# Patient Record
Sex: FEMALE | Race: BLACK OR AFRICAN AMERICAN | Employment: FULL TIME | URBAN - NONMETROPOLITAN AREA
[De-identification: names, ages, dates, MRNs, and addresses within clinical notes are randomized per-mention and may not be internally consistent; named-entity substitution may affect disease eponyms.]

---

## 2017-01-16 DIAGNOSIS — E55.9 HYPOVITAMINOSIS D: Primary | ICD-10-CM

## 2017-01-16 RX ORDER — ERGOCALCIFEROL 1.25 MG/1
50000 CAPSULE ORAL WEEKLY
Qty: 8 CAPSULE | Refills: 0 | Status: SHIPPED | OUTPATIENT
Start: 2017-01-16 | End: 2017-06-12 | Stop reason: ALTCHOICE

## 2017-02-08 ENCOUNTER — TELEPHONE (OUTPATIENT)
Dept: OTHER | Age: 47
End: 2017-02-08

## 2017-02-14 ENCOUNTER — OFFICE VISIT (OUTPATIENT)
Dept: OTHER | Age: 47
End: 2017-02-14

## 2017-02-14 VITALS — HEIGHT: 67 IN | BODY MASS INDEX: 42.53 KG/M2 | WEIGHT: 271 LBS

## 2017-02-14 DIAGNOSIS — Z79.4 TYPE 2 DIABETES MELLITUS WITH HYPERGLYCEMIA, WITH LONG-TERM CURRENT USE OF INSULIN (HCC): Primary | ICD-10-CM

## 2017-02-14 DIAGNOSIS — E11.65 TYPE 2 DIABETES MELLITUS WITH HYPERGLYCEMIA, WITH LONG-TERM CURRENT USE OF INSULIN (HCC): Primary | ICD-10-CM

## 2017-02-14 PROCEDURE — G0108 DIAB MANAGE TRN  PER INDIV: HCPCS | Performed by: REGISTERED NURSE

## 2017-02-14 RX ORDER — SODIUM HYPOCHLORITE 1.25 MG/ML
1 SOLUTION TOPICAL 3 TIMES DAILY
Refills: 0 | COMMUNITY
Start: 2017-02-02 | End: 2017-04-06 | Stop reason: ALTCHOICE

## 2017-02-14 RX ORDER — MINOCYCLINE HYDROCHLORIDE 50 MG/1
50 CAPSULE ORAL 2 TIMES DAILY
Refills: 0 | COMMUNITY
Start: 2017-02-01 | End: 2017-04-06 | Stop reason: ALTCHOICE

## 2017-02-16 ENCOUNTER — TELEPHONE (OUTPATIENT)
Dept: OTHER | Age: 47
End: 2017-02-16

## 2017-02-16 LAB
ESTIMATED AVERAGE GLUCOSE: 189 MG/DL
HBA1C MFR BLD: 8.2 % (ref 4.4–6.4)

## 2017-02-20 ENCOUNTER — TELEPHONE (OUTPATIENT)
Dept: INTERNAL MEDICINE | Age: 47
End: 2017-02-20

## 2017-02-21 ENCOUNTER — OFFICE VISIT (OUTPATIENT)
Dept: ENDOCRINOLOGY | Age: 47
End: 2017-02-21

## 2017-02-21 VITALS
DIASTOLIC BLOOD PRESSURE: 68 MMHG | WEIGHT: 272.5 LBS | HEART RATE: 79 BPM | RESPIRATION RATE: 20 BRPM | SYSTOLIC BLOOD PRESSURE: 133 MMHG | BODY MASS INDEX: 42.77 KG/M2 | HEIGHT: 67 IN

## 2017-02-21 DIAGNOSIS — Z79.4 TYPE 2 DIABETES MELLITUS WITH HYPERGLYCEMIA, WITH LONG-TERM CURRENT USE OF INSULIN (HCC): Primary | ICD-10-CM

## 2017-02-21 DIAGNOSIS — E55.9 HYPOVITAMINOSIS D: ICD-10-CM

## 2017-02-21 DIAGNOSIS — E11.628 DIABETIC FOOT INFECTION (HCC): ICD-10-CM

## 2017-02-21 DIAGNOSIS — E78.00 PURE HYPERCHOLESTEROLEMIA: ICD-10-CM

## 2017-02-21 DIAGNOSIS — L08.9 DIABETIC FOOT INFECTION (HCC): ICD-10-CM

## 2017-02-21 DIAGNOSIS — E11.65 TYPE 2 DIABETES MELLITUS WITH HYPERGLYCEMIA, WITH LONG-TERM CURRENT USE OF INSULIN (HCC): Primary | ICD-10-CM

## 2017-02-21 DIAGNOSIS — I10 ESSENTIAL HYPERTENSION: ICD-10-CM

## 2017-02-21 PROCEDURE — 99214 OFFICE O/P EST MOD 30 MIN: CPT | Performed by: INTERNAL MEDICINE

## 2017-02-21 ASSESSMENT — ENCOUNTER SYMPTOMS: BLURRED VISION: 0

## 2017-03-01 ENCOUNTER — TELEPHONE (OUTPATIENT)
Dept: OTHER | Age: 47
End: 2017-03-01

## 2017-03-03 LAB
CHOLESTEROL/HDL RELATIVE RISK: 3.5 (ref 4–4.4)
CHOLESTEROL: 136 MG/DL
CREATININE, RANDOM URINE: 172.9 MG/DL
DIRECT-LDL / HDL RISK: 2.3
HDLC SERPL-MCNC: 38 MG/DL
LDL CHOLESTEROL DIRECT: 90 MG/DL
MICROALBUMIN UR-MCNC: 5.7 MG/L
MICROALBUMIN/CREAT UR-RTO: 3.3 MG/GM (ref 0–30)
TRIGL SERPL-MCNC: 157 MG/DL
VLDLC SERPL CALC-MCNC: 31 MG/DL

## 2017-03-09 ENCOUNTER — TELEPHONE (OUTPATIENT)
Dept: OTHER | Age: 47
End: 2017-03-09

## 2017-03-29 ENCOUNTER — TELEPHONE (OUTPATIENT)
Dept: OTHER | Age: 47
End: 2017-03-29

## 2017-03-30 ENCOUNTER — TELEPHONE (OUTPATIENT)
Dept: OTHER | Age: 47
End: 2017-03-30

## 2017-04-06 ENCOUNTER — OFFICE VISIT (OUTPATIENT)
Dept: OTHER | Age: 47
End: 2017-04-06

## 2017-04-06 VITALS
BODY MASS INDEX: 43.32 KG/M2 | WEIGHT: 276 LBS | DIASTOLIC BLOOD PRESSURE: 68 MMHG | SYSTOLIC BLOOD PRESSURE: 112 MMHG | HEIGHT: 67 IN

## 2017-04-06 DIAGNOSIS — Z79.4 TYPE 2 DIABETES MELLITUS WITH COMPLICATION, WITH LONG-TERM CURRENT USE OF INSULIN (HCC): Primary | ICD-10-CM

## 2017-04-06 DIAGNOSIS — E11.8 TYPE 2 DIABETES MELLITUS WITH COMPLICATION, WITH LONG-TERM CURRENT USE OF INSULIN (HCC): Primary | ICD-10-CM

## 2017-04-06 RX ORDER — HYDROCODONE BITARTRATE AND ACETAMINOPHEN 5; 325 MG/1; MG/1
1 TABLET ORAL EVERY 6 HOURS PRN
COMMUNITY
Start: 2017-03-26 | End: 2017-05-22 | Stop reason: ALTCHOICE

## 2017-04-12 ENCOUNTER — TELEPHONE (OUTPATIENT)
Dept: OTHER | Age: 47
End: 2017-04-12

## 2017-04-19 ENCOUNTER — TELEPHONE (OUTPATIENT)
Dept: OTHER | Age: 47
End: 2017-04-19

## 2017-04-26 ENCOUNTER — TELEPHONE (OUTPATIENT)
Dept: OTHER | Age: 47
End: 2017-04-26

## 2017-05-18 ENCOUNTER — TELEPHONE (OUTPATIENT)
Dept: OTHER | Age: 47
End: 2017-05-18

## 2017-05-22 ENCOUNTER — OFFICE VISIT (OUTPATIENT)
Dept: ENDOCRINOLOGY | Age: 47
End: 2017-05-22

## 2017-05-22 VITALS
WEIGHT: 280.5 LBS | BODY MASS INDEX: 44.02 KG/M2 | RESPIRATION RATE: 14 BRPM | DIASTOLIC BLOOD PRESSURE: 62 MMHG | HEIGHT: 67 IN | SYSTOLIC BLOOD PRESSURE: 128 MMHG | HEART RATE: 75 BPM

## 2017-05-22 DIAGNOSIS — E11.8 TYPE 2 DIABETES MELLITUS WITH COMPLICATION, WITH LONG-TERM CURRENT USE OF INSULIN (HCC): Primary | ICD-10-CM

## 2017-05-22 DIAGNOSIS — E55.9 HYPOVITAMINOSIS D: ICD-10-CM

## 2017-05-22 DIAGNOSIS — Z79.4 TYPE 2 DIABETES MELLITUS WITH COMPLICATION, WITH LONG-TERM CURRENT USE OF INSULIN (HCC): Primary | ICD-10-CM

## 2017-05-22 PROCEDURE — 99214 OFFICE O/P EST MOD 30 MIN: CPT | Performed by: CLINICAL NURSE SPECIALIST

## 2017-05-22 ASSESSMENT — ENCOUNTER SYMPTOMS
CONSTIPATION: 0
WHEEZING: 0
COUGH: 0
CHEST TIGHTNESS: 0
EYE REDNESS: 0
NAUSEA: 0
DIARRHEA: 0
ABDOMINAL PAIN: 0
SHORTNESS OF BREATH: 0
VOICE CHANGE: 0

## 2017-06-01 LAB
AVERAGE GLUCOSE: 197 MG/DL (ref 66–114)
HBA1C MFR BLD: 8.5 % (ref 4.2–5.8)

## 2017-06-02 LAB
C-PEPTIDE: 2.9 NG/ML (ref 0.9–7.1)
VITAMIN D 25-HYDROXY: 15 NG/ML

## 2017-06-07 ENCOUNTER — TELEPHONE (OUTPATIENT)
Dept: ENDOCRINOLOGY | Age: 47
End: 2017-06-07

## 2017-06-07 DIAGNOSIS — Z79.4 TYPE 2 DIABETES MELLITUS WITH HYPERGLYCEMIA, WITH LONG-TERM CURRENT USE OF INSULIN (HCC): Primary | ICD-10-CM

## 2017-06-07 DIAGNOSIS — E55.9 HYPOVITAMINOSIS D: ICD-10-CM

## 2017-06-07 DIAGNOSIS — E11.65 TYPE 2 DIABETES MELLITUS WITH HYPERGLYCEMIA, WITH LONG-TERM CURRENT USE OF INSULIN (HCC): Primary | ICD-10-CM

## 2017-06-07 RX ORDER — GLUCOSAMINE HCL 500 MG
TABLET ORAL
Qty: 30 TABLET | COMMUNITY
Start: 2017-06-07 | End: 2017-06-12 | Stop reason: ALTCHOICE

## 2017-06-12 ENCOUNTER — OFFICE VISIT (OUTPATIENT)
Dept: OTHER | Age: 47
End: 2017-06-12

## 2017-06-12 VITALS
DIASTOLIC BLOOD PRESSURE: 76 MMHG | WEIGHT: 279.8 LBS | BODY MASS INDEX: 43.92 KG/M2 | SYSTOLIC BLOOD PRESSURE: 144 MMHG | HEART RATE: 94 BPM

## 2017-06-12 DIAGNOSIS — Z79.4 TYPE 2 DIABETES MELLITUS WITH COMPLICATION, WITH LONG-TERM CURRENT USE OF INSULIN (HCC): Primary | ICD-10-CM

## 2017-06-12 DIAGNOSIS — E11.8 TYPE 2 DIABETES MELLITUS WITH COMPLICATION, WITH LONG-TERM CURRENT USE OF INSULIN (HCC): Primary | ICD-10-CM

## 2017-06-21 ENCOUNTER — TELEPHONE (OUTPATIENT)
Dept: ENDOCRINOLOGY | Age: 47
End: 2017-06-21

## 2017-07-31 ENCOUNTER — TELEPHONE (OUTPATIENT)
Dept: PHARMACY | Age: 47
End: 2017-07-31

## 2017-08-01 ENCOUNTER — TELEPHONE (OUTPATIENT)
Dept: PHARMACY | Age: 47
End: 2017-08-01

## 2017-08-02 LAB
AVERAGE GLUCOSE: 7.7
CREATININE, URINE: NORMAL
HBA1C MFR BLD: NORMAL %
MICROALBUMIN/CREAT 24H UR: 20 MG/G{CREAT}
MICROALBUMIN/CREAT UR-RTO: NORMAL

## 2017-08-04 ENCOUNTER — TELEPHONE (OUTPATIENT)
Dept: ENDOCRINOLOGY | Age: 47
End: 2017-08-04

## 2017-08-22 ENCOUNTER — HOSPITAL ENCOUNTER (OUTPATIENT)
Age: 47
Discharge: HOME | End: 2017-08-22
Payer: COMMERCIAL

## 2017-08-22 ENCOUNTER — OFFICE VISIT (OUTPATIENT)
Dept: ENDOCRINOLOGY | Age: 47
End: 2017-08-22
Payer: COMMERCIAL

## 2017-08-22 VITALS
BODY MASS INDEX: 42.66 KG/M2 | RESPIRATION RATE: 20 BRPM | HEART RATE: 74 BPM | WEIGHT: 271.8 LBS | HEIGHT: 67 IN | DIASTOLIC BLOOD PRESSURE: 64 MMHG | SYSTOLIC BLOOD PRESSURE: 118 MMHG

## 2017-08-22 DIAGNOSIS — Z79.4 TYPE 2 DIABETES MELLITUS WITH HYPERGLYCEMIA, WITH LONG-TERM CURRENT USE OF INSULIN (HCC): ICD-10-CM

## 2017-08-22 DIAGNOSIS — E11.65 TYPE 2 DIABETES MELLITUS WITH HYPERGLYCEMIA, WITH LONG-TERM CURRENT USE OF INSULIN (HCC): ICD-10-CM

## 2017-08-22 DIAGNOSIS — E11.65 TYPE 2 DIABETES MELLITUS WITH HYPERGLYCEMIA, WITH LONG-TERM CURRENT USE OF INSULIN (HCC): Primary | ICD-10-CM

## 2017-08-22 DIAGNOSIS — R79.89 LOW VITAMIN D LEVEL: ICD-10-CM

## 2017-08-22 DIAGNOSIS — Z79.4 TYPE 2 DIABETES MELLITUS WITH HYPERGLYCEMIA, WITH LONG-TERM CURRENT USE OF INSULIN (HCC): Primary | ICD-10-CM

## 2017-08-22 DIAGNOSIS — I10 ESSENTIAL HYPERTENSION: ICD-10-CM

## 2017-08-22 DIAGNOSIS — E78.00 PURE HYPERCHOLESTEROLEMIA: ICD-10-CM

## 2017-08-22 LAB
ALBUMIN SERPL-MCNC: 4.2 G/DL (ref 3.5–5.1)
ALP BLD-CCNC: 61 U/L (ref 38–126)
ALT SERPL-CCNC: 24 U/L (ref 11–66)
ANION GAP SERPL CALCULATED.3IONS-SCNC: 16 MEQ/L (ref 8–16)
AST SERPL-CCNC: 24 U/L (ref 5–40)
BILIRUB SERPL-MCNC: 0.3 MG/DL (ref 0.3–1.2)
BUN BLDV-MCNC: 18 MG/DL (ref 7–22)
CALCIUM SERPL-MCNC: 9.7 MG/DL (ref 8.5–10.5)
CHLORIDE BLD-SCNC: 97 MEQ/L (ref 98–111)
CO2: 28 MEQ/L (ref 23–33)
CREAT SERPL-MCNC: 0.6 MG/DL (ref 0.4–1.2)
GFR SERPL CREATININE-BSD FRML MDRD: > 90 ML/MIN/1.73M2
GLUCOSE BLD-MCNC: 163 MG/DL (ref 70–108)
POTASSIUM SERPL-SCNC: 4.1 MEQ/L (ref 3.5–5.2)
SODIUM BLD-SCNC: 141 MEQ/L (ref 135–145)
TOTAL PROTEIN: 8 G/DL (ref 6.1–8)
VITAMIN D 25-HYDROXY: 20 NG/ML (ref 30–100)

## 2017-08-22 PROCEDURE — 80053 COMPREHEN METABOLIC PANEL: CPT

## 2017-08-22 PROCEDURE — 99214 OFFICE O/P EST MOD 30 MIN: CPT | Performed by: INTERNAL MEDICINE

## 2017-08-22 PROCEDURE — 36415 COLL VENOUS BLD VENIPUNCTURE: CPT

## 2017-08-22 PROCEDURE — 82306 VITAMIN D 25 HYDROXY: CPT

## 2017-08-22 RX ORDER — AMLODIPINE BESYLATE 10 MG/1
5 TABLET ORAL DAILY
COMMUNITY
End: 2018-03-22 | Stop reason: ALTCHOICE

## 2017-08-22 ASSESSMENT — ENCOUNTER SYMPTOMS
BLURRED VISION: 0
DOUBLE VISION: 0
SHORTNESS OF BREATH: 0
COUGH: 0
VOMITING: 0
PHOTOPHOBIA: 0
NAUSEA: 0
ABDOMINAL PAIN: 0

## 2017-09-18 ENCOUNTER — HOSPITAL ENCOUNTER (OUTPATIENT)
Dept: PHARMACY | Age: 47
Setting detail: THERAPIES SERIES
Discharge: HOME | End: 2017-09-18
Payer: COMMERCIAL

## 2017-09-18 PROCEDURE — G0108 DIAB MANAGE TRN  PER INDIV: HCPCS | Performed by: REGISTERED NURSE

## 2017-09-19 VITALS
BODY MASS INDEX: 42.97 KG/M2 | WEIGHT: 273.8 LBS | DIASTOLIC BLOOD PRESSURE: 72 MMHG | HEIGHT: 67 IN | SYSTOLIC BLOOD PRESSURE: 110 MMHG

## 2017-10-18 ENCOUNTER — HOSPITAL ENCOUNTER (OUTPATIENT)
Dept: PHARMACY | Age: 47
Setting detail: THERAPIES SERIES
Discharge: HOME | End: 2017-10-18
Payer: COMMERCIAL

## 2017-10-18 VITALS
DIASTOLIC BLOOD PRESSURE: 72 MMHG | HEIGHT: 67 IN | SYSTOLIC BLOOD PRESSURE: 126 MMHG | WEIGHT: 268.2 LBS | BODY MASS INDEX: 42.09 KG/M2

## 2017-10-18 DIAGNOSIS — E11.65 TYPE 2 DIABETES MELLITUS WITH HYPERGLYCEMIA, WITH LONG-TERM CURRENT USE OF INSULIN (HCC): ICD-10-CM

## 2017-10-18 DIAGNOSIS — Z79.4 TYPE 2 DIABETES MELLITUS WITH HYPERGLYCEMIA, WITH LONG-TERM CURRENT USE OF INSULIN (HCC): ICD-10-CM

## 2017-10-18 PROCEDURE — G0108 DIAB MANAGE TRN  PER INDIV: HCPCS | Performed by: REGISTERED NURSE

## 2017-10-18 RX ORDER — LATANOPROST 50 UG/ML
1 SOLUTION/ DROPS OPHTHALMIC DAILY
Refills: 0 | COMMUNITY
Start: 2017-10-11 | End: 2020-11-10 | Stop reason: ALTCHOICE

## 2017-11-28 ENCOUNTER — TELEPHONE (OUTPATIENT)
Dept: ENDOCRINOLOGY | Age: 47
End: 2017-11-28

## 2017-11-28 ENCOUNTER — OFFICE VISIT (OUTPATIENT)
Dept: ENDOCRINOLOGY | Age: 47
End: 2017-11-28
Payer: COMMERCIAL

## 2017-11-28 ENCOUNTER — TELEPHONE (OUTPATIENT)
Dept: PHARMACY | Age: 47
End: 2017-11-28

## 2017-11-28 ENCOUNTER — HOSPITAL ENCOUNTER (OUTPATIENT)
Age: 47
Discharge: HOME | End: 2017-11-28
Payer: COMMERCIAL

## 2017-11-28 VITALS
SYSTOLIC BLOOD PRESSURE: 136 MMHG | RESPIRATION RATE: 18 BRPM | WEIGHT: 268 LBS | HEIGHT: 67 IN | BODY MASS INDEX: 42.06 KG/M2 | DIASTOLIC BLOOD PRESSURE: 72 MMHG | HEART RATE: 87 BPM

## 2017-11-28 DIAGNOSIS — E11.65 TYPE 2 DIABETES MELLITUS WITH HYPERGLYCEMIA, WITH LONG-TERM CURRENT USE OF INSULIN (HCC): Primary | ICD-10-CM

## 2017-11-28 DIAGNOSIS — E78.2 MIXED HYPERLIPIDEMIA: ICD-10-CM

## 2017-11-28 DIAGNOSIS — E55.9 HYPOVITAMINOSIS D: ICD-10-CM

## 2017-11-28 DIAGNOSIS — Z79.4 TYPE 2 DIABETES MELLITUS WITH HYPERGLYCEMIA, WITH LONG-TERM CURRENT USE OF INSULIN (HCC): ICD-10-CM

## 2017-11-28 DIAGNOSIS — E78.5 HYPERLIPIDEMIA, UNSPECIFIED HYPERLIPIDEMIA TYPE: Primary | ICD-10-CM

## 2017-11-28 DIAGNOSIS — E11.65 TYPE 2 DIABETES MELLITUS WITH HYPERGLYCEMIA, WITH LONG-TERM CURRENT USE OF INSULIN (HCC): ICD-10-CM

## 2017-11-28 DIAGNOSIS — Z79.4 TYPE 2 DIABETES MELLITUS WITH HYPERGLYCEMIA, WITH LONG-TERM CURRENT USE OF INSULIN (HCC): Primary | ICD-10-CM

## 2017-11-28 LAB
ALBUMIN SERPL-MCNC: 4.1 G/DL (ref 3.5–5.1)
ALP BLD-CCNC: 70 U/L (ref 38–126)
ALT SERPL-CCNC: 17 U/L (ref 11–66)
ANION GAP SERPL CALCULATED.3IONS-SCNC: 16 MEQ/L (ref 8–16)
AST SERPL-CCNC: 15 U/L (ref 5–40)
AVERAGE GLUCOSE: 150 MG/DL (ref 70–126)
BILIRUB SERPL-MCNC: 0.3 MG/DL (ref 0.3–1.2)
BUN BLDV-MCNC: 11 MG/DL (ref 7–22)
CALCIUM SERPL-MCNC: 9.6 MG/DL (ref 8.5–10.5)
CHLORIDE BLD-SCNC: 97 MEQ/L (ref 98–111)
CHOLESTEROL, TOTAL: 142 MG/DL (ref 100–199)
CO2: 25 MEQ/L (ref 23–33)
CREAT SERPL-MCNC: 0.5 MG/DL (ref 0.4–1.2)
CREATININE, URINE: 111.2 MG/DL
GFR SERPL CREATININE-BSD FRML MDRD: > 90 ML/MIN/1.73M2
GLUCOSE BLD-MCNC: 368 MG/DL (ref 70–108)
HBA1C MFR BLD: 7 % (ref 4.4–6.4)
MICROALBUMIN UR-MCNC: 1.67 MG/DL
MICROALBUMIN/CREAT UR-RTO: 15 MG/G (ref 0–30)
POTASSIUM SERPL-SCNC: 4.1 MEQ/L (ref 3.5–5.2)
SODIUM BLD-SCNC: 138 MEQ/L (ref 135–145)
TOTAL PROTEIN: 7.9 G/DL (ref 6.1–8)
VITAMIN D 25-HYDROXY: 19 NG/ML (ref 30–100)

## 2017-11-28 PROCEDURE — 80053 COMPREHEN METABOLIC PANEL: CPT

## 2017-11-28 PROCEDURE — 82043 UR ALBUMIN QUANTITATIVE: CPT

## 2017-11-28 PROCEDURE — 80061 LIPID PANEL: CPT

## 2017-11-28 PROCEDURE — 36415 COLL VENOUS BLD VENIPUNCTURE: CPT

## 2017-11-28 PROCEDURE — 82465 ASSAY BLD/SERUM CHOLESTEROL: CPT

## 2017-11-28 PROCEDURE — 99214 OFFICE O/P EST MOD 30 MIN: CPT | Performed by: CLINICAL NURSE SPECIALIST

## 2017-11-28 PROCEDURE — 83036 HEMOGLOBIN GLYCOSYLATED A1C: CPT

## 2017-11-28 PROCEDURE — 82306 VITAMIN D 25 HYDROXY: CPT

## 2017-11-28 RX ORDER — BRIMONIDINE TARTRATE, TIMOLOL MALEATE 2; 5 MG/ML; MG/ML
1 SOLUTION/ DROPS OPHTHALMIC EVERY 12 HOURS
COMMUNITY

## 2017-11-28 ASSESSMENT — ENCOUNTER SYMPTOMS
NAUSEA: 0
DIARRHEA: 0
CONSTIPATION: 0
COUGH: 0
WHEEZING: 0
VOICE CHANGE: 0
CHEST TIGHTNESS: 0
ABDOMINAL PAIN: 0
SHORTNESS OF BREATH: 0
EYE REDNESS: 0

## 2017-11-28 NOTE — PROGRESS NOTES
tendon    TOE SURGERY Left 03/17/2017    third toe  Dr. Austin Ramirez     Family History   Problem Relation Age of Onset    Asthma Mother     Diabetes Mother     COPD Mother     Heart Disease Mother     High Cholesterol Mother     High Blood Pressure Father     Heart Disease Father      Social History   Substance Use Topics    Smoking status: Former Smoker     Packs/day: 0.20     Years: 5.00     Types: Cigarettes     Quit date: 3/4/2000    Smokeless tobacco: Never Used    Alcohol use No      Current Outpatient Prescriptions   Medication Sig Dispense Refill    brimonidine-timolol (COMBIGAN) 0.2-0.5 % ophthalmic solution Place 1 drop into both eyes every 12 hours      insulin aspart (NOVOLOG) 100 UNIT/ML injection vial Per insulin pump (max 165 units daily) 15 vial 1    latanoprost (XALATAN) 0.005 % ophthalmic solution Place 1 drop into both eyes daily  0    Liraglutide (VICTOZA) 18 MG/3ML SOPN SC injection Inject 1.2 mg into the skin daily 1 Pen 0    amLODIPine (NORVASC) 10 MG tablet Take 10 mg by mouth daily      vitamin D (CHOLECALCIFEROL) 1000 UNIT TABS tablet Take 3,000 Units by mouth daily      Blood Glucose Monitoring Suppl (ONE TOUCH ULTRA 2) W/DEVICE KIT Check blood sugar 6 x daily (Dx: E11.65) 1 kit 0    glucose blood VI test strips (ONE TOUCH ULTRA TEST) strip Check blood sugar 6 x daily (Dx: E11.65) 200 each 5    Lancets MISC Check blood sugar 6 x daily (Dx: E11.65) 200 each 5    glucagon 1 MG injection Inject 1 mg into the skin See Admin Instructions Follow package directions for low blood sugar.  1 kit 3    venlafaxine (EFFEXOR-XR) 150 MG XR capsule Take 150 mg by mouth daily      acetaminophen (TYLENOL) 325 MG tablet Take 650 mg by mouth every 6 hours as needed for Pain      Loratadine (CLARITIN PO) Take by mouth daily as needed Taking generic form and Non drowsy formula       Pregabalin (LYRICA PO) Take 150 mg by mouth 2 times daily       atorvastatin scleral icterus. Neck: Normal range of motion. Neck supple. No thyromegaly present. Cardiovascular: Normal rate, regular rhythm, normal heart sounds and intact distal pulses. No murmur heard. Pulmonary/Chest: Effort normal and breath sounds normal. No stridor. No respiratory distress. She has no wheezes. She has no rales. Abdominal: Soft. Bowel sounds are normal. There is no tenderness. Musculoskeletal: Normal range of motion. She exhibits no edema or tenderness. Lymphadenopathy:     She has no cervical adenopathy. Neurological: She is alert and oriented to person, place, and time. No cranial nerve deficit. Coordination normal.   Skin: Skin is warm and dry. She is not diaphoretic. Psychiatric: She has a normal mood and affect. Her behavior is normal. Judgment and thought content normal.     Patient was asked to remove their socks and shoes and a full foot exam was performed. The following was found during the patient's foot exam:  Monofilament sensation   []   Normal         [x]   Abnormal   Pulses   [x]   Normal            []   Abnormal    Comments:  S/p left 2nd toe removal. Right 2nd toe dryness - Light touch absent below knees - to see     Assessment:      /72 (Site: Right Arm, Position: Sitting, Cuff Size: Large Adult)   Pulse 87   Resp 18   Ht 5' 7.01\" (1.702 m)   Wt 268 lb (121.6 kg)   BMI 41.96 kg/m²   1. Type 2 diabetes mellitus with hyperglycemia, with long-term current use of insulin (HCC) - no change in pump setting at this time with variable readings throughout day above goal to goal without discernible pattern or explanation for glycemic variability - suggest improvement with diet/exercise. Request reading 2 AM to 4 AM - reviewed glycemic goals, request readings if not in goal/lows/concerns, therapeutic lifestyle changes. 8/2017 BUN 18, creat 0.6, GFR >90, malb/creat 3.3 in 3/2017- on ACE  A1c, malb/creat on follow up     2.  Mixed hyperlipidemia -  treated and tolerating 3/2017 chol 136, trig 157, HDL 38, LDL 90; 9/2017 liver panel normal - recheck lipids, CMP on follow up     3. Hypovitaminosis D - 8/2017 level low at 20 - replaced at 3000 units daily - to continue - will recheck level       4. Hypertension - controlled with medication    Plan:      Check blood sugar 2 AM to 4 AM   Blood sugar goal 100 to 140  Call between visits if not in goal/lows/concerns  Continue with educator  Continue Victoza 1.2 mg daily. Call and discontinue for abdominal pain or other adverse effects. Labs before next visit    Return in about 3 months (around 2/28/2018).        Electronically signed by EM Hoffman on 11/28/2017 at 10:11 AM

## 2017-11-28 NOTE — TELEPHONE ENCOUNTER
Patient had labs drawn today (although requested before next visit) - can lab do lipids from sample drawn today.

## 2017-11-29 ENCOUNTER — TELEPHONE (OUTPATIENT)
Dept: ENDOCRINOLOGY | Age: 47
End: 2017-11-29

## 2017-11-29 NOTE — TELEPHONE ENCOUNTER
I want lipids (cholesterol, triglycerides, HDL, LDL) - probably too late now to get from sample already drawn but please check.

## 2017-11-30 LAB
CHOLESTEROL, TOTAL: 150 MG/DL (ref 100–199)
HDLC SERPL-MCNC: 31 MG/DL
LDL CHOLESTEROL CALCULATED: 69 MG/DL
TRIGL SERPL-MCNC: 252 MG/DL (ref 0–199)

## 2017-12-05 ENCOUNTER — TELEPHONE (OUTPATIENT)
Dept: ENDOCRINOLOGY | Age: 47
End: 2017-12-05

## 2017-12-06 ENCOUNTER — TELEPHONE (OUTPATIENT)
Dept: PHARMACY | Age: 47
End: 2017-12-06

## 2017-12-06 NOTE — TELEPHONE ENCOUNTER
Left message for Dee to call back to r/s appt with RD from 12/13/17 as RD will be out of office that day. Call back number and office hours provided.

## 2018-01-16 ENCOUNTER — TELEPHONE (OUTPATIENT)
Dept: PHARMACY | Age: 48
End: 2018-01-16

## 2018-01-16 ENCOUNTER — HOSPITAL ENCOUNTER (OUTPATIENT)
Dept: PHARMACY | Age: 48
Setting detail: THERAPIES SERIES
Discharge: HOME | End: 2018-01-16
Payer: COMMERCIAL

## 2018-01-16 VITALS — HEIGHT: 67 IN | BODY MASS INDEX: 43.63 KG/M2 | WEIGHT: 278 LBS

## 2018-01-16 PROCEDURE — 97802 MEDICAL NUTRITION INDIV IN: CPT | Performed by: DIETITIAN, REGISTERED

## 2018-01-16 NOTE — TELEPHONE ENCOUNTER
Dee called back. [phone]. She states she has not had an appetite because of the victoza, so she has not been eating many gms of CHO. Her BS have been high because of a bone infection in her foot, that is being treated with steroids and antibiotics.

## 2018-01-16 NOTE — PROGRESS NOTES
a lot. No structured exercise. Current Outpatient Prescriptions on File Prior to Encounter   Medication Sig Dispense Refill    brimonidine-timolol (COMBIGAN) 0.2-0.5 % ophthalmic solution Place 1 drop into both eyes every 12 hours      insulin aspart (NOVOLOG) 100 UNIT/ML injection vial Per insulin pump (max 165 units daily) 15 vial 1    latanoprost (XALATAN) 0.005 % ophthalmic solution Place 1 drop into both eyes daily  0    Liraglutide (VICTOZA) 18 MG/3ML SOPN SC injection Inject 1.2 mg into the skin daily 1 Pen 0    amLODIPine (NORVASC) 10 MG tablet Take 10 mg by mouth daily      vitamin D (CHOLECALCIFEROL) 1000 UNIT TABS tablet Take 3,000 Units by mouth daily      Blood Glucose Monitoring Suppl (ONE TOUCH ULTRA 2) W/DEVICE KIT Check blood sugar 6 x daily (Dx: E11.65) 1 kit 0    glucose blood VI test strips (ONE TOUCH ULTRA TEST) strip Check blood sugar 6 x daily (Dx: E11.65) 200 each 5    Lancets MISC Check blood sugar 6 x daily (Dx: E11.65) 200 each 5    glucagon 1 MG injection Inject 1 mg into the skin See Admin Instructions Follow package directions for low blood sugar. 1 kit 3    venlafaxine (EFFEXOR-XR) 150 MG XR capsule Take 150 mg by mouth daily      acetaminophen (TYLENOL) 325 MG tablet Take 650 mg by mouth every 6 hours as needed for Pain      Loratadine (CLARITIN PO) Take by mouth daily as needed Taking generic form and Non drowsy formula       Pregabalin (LYRICA PO) Take 150 mg by mouth 2 times daily       atorvastatin (LIPITOR) 20 MG tablet Take 20 mg by mouth daily.  lisinopril-hydrochlorothiazide (PRINZIDE;ZESTORETIC) 20-12.5 MG per tablet Take 1 tablet by mouth 2 times daily.  traZODone (DESYREL) 50 MG tablet Take 50 mg by mouth nightly.  aspirin 81 MG tablet Take 81 mg by mouth daily. No current facility-administered medications on file prior to encounter. Vitals from current and previous visits:  5'7\"  Wt. 278#   -Body mass index is 43.54 kg/m². Greater than 40 - Morbid Obesity / Extreme Obesity / Grade III. -Weight goal: lose weight. 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 .         Intervention:  -Impression: Pt here with daughter and daughter does a lot of the cooking at home. Per pt, daughter is pre-diabetic. Both can follow the healthy eating plan and support each other.  -Instructed the patient on: Consistent Carbohydrate Intake, Meal Planning for Regular, Balanced Meals & Snacks, Plate Method, The Importance of Regular Physical Activity and choose high fiber/complex carb food choices in place of refined carb choices.    -Handouts given for: carbohydrate counting, food logging, healthy snacks, plate method and sample meal plans/menus. Pt instructions/goals:  1.)  Eat a complex carbohydrate in the morning when you wake up. ~30g carb with protein. 2.)  Read the nutrition facts label for serving size and total carbohydrates. Without a label refer to your carb counting guide booklet. Remember your meal plan at meal times (lunch and dinner) of 45 gms carbs. 3.)  Choose fibrous carbs - whole grains, vegetables, fresh fruit. 4.)  Cut back on added fats and high fat food choices to help with weight loss. Drink Arginaid 1-2 pkts/day to help with wound healing. Follow the picture of the plate as a quick reference for balanced nutrition. Bring a one week food log to next dietitian appt. -General Diet Recommendations: low fat, low cholesterol, increase fiber intake and consistent carb intake.  -Nutrition prescription: 1600 - 1800  calories/day, 180 g carbs/day. <50 gms fat/day    Comprehension verified using teachback method.     Monitoring/Evaluation:   -Followup visit: 8 weeks with RN-NEELE.   -Receptiveness to education/goals: Agreeable.  -Evaluation of education: Indicates understanding.  -Readiness to change: contemplation -

## 2018-01-16 NOTE — TELEPHONE ENCOUNTER
Dee present for RD visit today. RN reviewing pump download after visit. Note: elevated BS's. But also noting that there is minimal carbohydrate being bolused for daily  Total daily carbs range 30-95 grams  Call to [address] [address] message. Is she bolusing for all of the carbs she is eating? Per dietary report I appears she is not bolusing for all carbs eaten. Instructed to call the clinic back to review.

## 2018-01-16 NOTE — LETTER
loss.  Drink Arginaid 1-2 pkts/day to help with wound healing. Follow the picture of the plate as a quick reference for balanced nutrition. Bring a one week food log to next dietitian appt. -General Diet Recommendations: low fat, low cholesterol, increase fiber intake and consistent carb intake.  -Nutrition prescription: 1600 - 1800  calories/day, 180 g carbs/day. <50 gms fat/day    Comprehension verified using teachback method. Monitoring/Evaluation:   -Followup visit: 8 weeks with RN-CDE.   -Receptiveness to education/goals: Agreeable.  -Evaluation of education: Indicates understanding.  -Readiness to change: contemplation - ambivalent about change low fat, plate method.  -Expected compliance: fair. Thank you for your referral of this patient. If you have questions, please do not hesitate to call me. I look forward to following Dee along with you.     Sincerely,        Shreyas Dillon, HEIDI, LD

## 2018-01-24 DIAGNOSIS — Z79.4 TYPE 2 DIABETES MELLITUS WITH COMPLICATION, WITH LONG-TERM CURRENT USE OF INSULIN (HCC): ICD-10-CM

## 2018-01-24 DIAGNOSIS — E11.8 TYPE 2 DIABETES MELLITUS WITH COMPLICATION, WITH LONG-TERM CURRENT USE OF INSULIN (HCC): ICD-10-CM

## 2018-01-24 DIAGNOSIS — E55.9 HYPOVITAMINOSIS D: Primary | ICD-10-CM

## 2018-02-22 LAB — VITAMIN D 25-HYDROXY: 18.01 NG/ML (ref 30–100)

## 2018-02-23 LAB
ESTIMATED AVERAGE GLUCOSE: 174 MG/DL
HBA1C MFR BLD: 7.7 % (ref 4.4–6.4)

## 2018-02-28 ENCOUNTER — OFFICE VISIT (OUTPATIENT)
Dept: ENDOCRINOLOGY | Age: 48
End: 2018-02-28
Payer: COMMERCIAL

## 2018-02-28 VITALS
SYSTOLIC BLOOD PRESSURE: 138 MMHG | DIASTOLIC BLOOD PRESSURE: 81 MMHG | HEART RATE: 79 BPM | RESPIRATION RATE: 18 BRPM | BODY MASS INDEX: 42.85 KG/M2 | WEIGHT: 273 LBS | HEIGHT: 67 IN

## 2018-02-28 DIAGNOSIS — Z79.4 TYPE 2 DIABETES MELLITUS WITH COMPLICATION, WITH LONG-TERM CURRENT USE OF INSULIN (HCC): Primary | ICD-10-CM

## 2018-02-28 DIAGNOSIS — E11.8 TYPE 2 DIABETES MELLITUS WITH COMPLICATION, WITH LONG-TERM CURRENT USE OF INSULIN (HCC): Primary | ICD-10-CM

## 2018-02-28 DIAGNOSIS — E55.9 HYPOVITAMINOSIS D: ICD-10-CM

## 2018-02-28 DIAGNOSIS — E78.2 MIXED HYPERLIPIDEMIA: ICD-10-CM

## 2018-02-28 PROCEDURE — 99214 OFFICE O/P EST MOD 30 MIN: CPT | Performed by: CLINICAL NURSE SPECIALIST

## 2018-02-28 RX ORDER — ERGOCALCIFEROL 1.25 MG/1
50000 CAPSULE ORAL WEEKLY
Qty: 6 CAPSULE | Refills: 0 | Status: SHIPPED | OUTPATIENT
Start: 2018-02-28 | End: 2018-02-28 | Stop reason: SDUPTHER

## 2018-02-28 RX ORDER — ERGOCALCIFEROL 1.25 MG/1
50000 CAPSULE ORAL WEEKLY
Qty: 6 CAPSULE | Refills: 0 | Status: SHIPPED | OUTPATIENT
Start: 2018-02-28 | End: 2018-03-22 | Stop reason: DRUGHIGH

## 2018-02-28 ASSESSMENT — ENCOUNTER SYMPTOMS
SHORTNESS OF BREATH: 0
DIARRHEA: 0
COUGH: 0
CONSTIPATION: 0
VOICE CHANGE: 0
WHEEZING: 0
NAUSEA: 0
EYE REDNESS: 0
ABDOMINAL PAIN: 0
CHEST TIGHTNESS: 0

## 2018-02-28 NOTE — PROGRESS NOTES
TOE SURGERY Left 03/17/2017    third toe  Dr. Mac Nicholas     Family History   Problem Relation Age of Onset    Asthma Mother     Diabetes Mother     COPD Mother     Heart Disease Mother     High Cholesterol Mother     High Blood Pressure Father     Heart Disease Father      Social History   Substance Use Topics    Smoking status: Former Smoker     Packs/day: 0.20     Years: 5.00     Types: Cigarettes     Quit date: 3/4/2000    Smokeless tobacco: Never Used    Alcohol use No      Current Outpatient Prescriptions   Medication Sig Dispense Refill    vitamin D (ERGOCALCIFEROL) 14789 units CAPS capsule Take 1 capsule by mouth once a week For 6 weeks 6 capsule 0    vitamin D (CHOLECALCIFEROL) 1000 UNIT TABS tablet Take 4 tablets by mouth daily 60 tablet     brimonidine-timolol (COMBIGAN) 0.2-0.5 % ophthalmic solution Place 1 drop into both eyes every 12 hours      insulin aspart (NOVOLOG) 100 UNIT/ML injection vial Per insulin pump (max 165 units daily) 15 vial 1    latanoprost (XALATAN) 0.005 % ophthalmic solution Place 1 drop into both eyes daily  0    Liraglutide (VICTOZA) 18 MG/3ML SOPN SC injection Inject 1.2 mg into the skin daily 1 Pen 0    amLODIPine (NORVASC) 10 MG tablet Take 5 mg by mouth daily       Blood Glucose Monitoring Suppl (ONE TOUCH ULTRA 2) W/DEVICE KIT Check blood sugar 6 x daily (Dx: E11.65) 1 kit 0    glucose blood VI test strips (ONE TOUCH ULTRA TEST) strip Check blood sugar 6 x daily (Dx: E11.65) 200 each 5    Lancets MISC Check blood sugar 6 x daily (Dx: E11.65) 200 each 5    glucagon 1 MG injection Inject 1 mg into the skin See Admin Instructions Follow package directions for low blood sugar.  1 kit 3    venlafaxine (EFFEXOR-XR) 150 MG XR capsule Take 150 mg by mouth daily      acetaminophen (TYLENOL) 325 MG tablet Take 650 mg by mouth every 6 hours as needed for Pain      Loratadine (CLARITIN PO) Take by mouth daily as needed Taking generic form and Non drowsy formula       Pregabalin (LYRICA PO) Take 150 mg by mouth 2 times daily       atorvastatin (LIPITOR) 20 MG tablet Take 20 mg by mouth daily.  lisinopril-hydrochlorothiazide (PRINZIDE;ZESTORETIC) 20-12.5 MG per tablet Take 1 tablet by mouth 2 times daily.  traZODone (DESYREL) 50 MG tablet Take 50 mg by mouth nightly.  aspirin 81 MG tablet Take 81 mg by mouth daily. No current facility-administered medications for this visit. Allergies   Allergen Reactions    Coconut Flavor      (real coconut)    Nuts [Peanut-Containing Drug Products]      Walnuts only       Subjective:      Review of Systems   Constitutional: Negative for appetite change, chills, fatigue and unexpected weight change. HENT: Negative for hearing loss, tinnitus and voice change. Eyes: Negative for redness and visual disturbance. Respiratory: Negative for cough, chest tightness, shortness of breath and wheezing. Cardiovascular: Negative for chest pain, palpitations and leg swelling. Gastrointestinal: Negative for abdominal pain, constipation, diarrhea and nausea. Endocrine: Negative. Genitourinary: Negative for difficulty urinating, dysuria, frequency and hematuria. Musculoskeletal: Negative for gait problem, myalgias and neck pain. Skin: Negative for rash. Neurological: Negative for dizziness, tremors, facial asymmetry, weakness, numbness and headaches. Hematological: Negative for adenopathy. Psychiatric/Behavioral: Negative for agitation, confusion, sleep disturbance and suicidal ideas. The patient is not nervous/anxious and is not hyperactive. Objective:     Physical Exam   Constitutional: She is oriented to person, place, and time. She appears well-developed and well-nourished. No distress. HENT:   Head: Normocephalic and atraumatic.    Right Ear: External ear normal.   Left Ear: External ear normal.   Nose: Nose normal.   Eyes: Conjunctivae and EOM are normal. Pupils are

## 2018-03-22 ENCOUNTER — OFFICE VISIT (OUTPATIENT)
Dept: INTERNAL MEDICINE CLINIC | Age: 48
End: 2018-03-22
Payer: COMMERCIAL

## 2018-03-22 VITALS
BODY MASS INDEX: 42.53 KG/M2 | SYSTOLIC BLOOD PRESSURE: 108 MMHG | DIASTOLIC BLOOD PRESSURE: 70 MMHG | HEART RATE: 80 BPM | HEIGHT: 67 IN | WEIGHT: 271 LBS

## 2018-03-22 DIAGNOSIS — Z79.4 TYPE 2 DIABETES MELLITUS WITHOUT COMPLICATION, WITH LONG-TERM CURRENT USE OF INSULIN (HCC): Primary | ICD-10-CM

## 2018-03-22 DIAGNOSIS — L08.9 DIABETIC FOOT INFECTION (HCC): ICD-10-CM

## 2018-03-22 DIAGNOSIS — E78.5 HYPERLIPIDEMIA, UNSPECIFIED HYPERLIPIDEMIA TYPE: ICD-10-CM

## 2018-03-22 DIAGNOSIS — I10 ESSENTIAL HYPERTENSION: ICD-10-CM

## 2018-03-22 DIAGNOSIS — E11.628 DIABETIC FOOT INFECTION (HCC): ICD-10-CM

## 2018-03-22 DIAGNOSIS — E66.01 OBESITY, MORBID, BMI 40.0-49.9 (HCC): ICD-10-CM

## 2018-03-22 DIAGNOSIS — E11.9 TYPE 2 DIABETES MELLITUS WITHOUT COMPLICATION, WITH LONG-TERM CURRENT USE OF INSULIN (HCC): Primary | ICD-10-CM

## 2018-03-22 PROCEDURE — G8417 CALC BMI ABV UP PARAM F/U: HCPCS | Performed by: INTERNAL MEDICINE

## 2018-03-22 PROCEDURE — G8482 FLU IMMUNIZE ORDER/ADMIN: HCPCS | Performed by: INTERNAL MEDICINE

## 2018-03-22 PROCEDURE — 1036F TOBACCO NON-USER: CPT | Performed by: INTERNAL MEDICINE

## 2018-03-22 PROCEDURE — 3045F PR MOST RECENT HEMOGLOBIN A1C LEVEL 7.0-9.0%: CPT | Performed by: INTERNAL MEDICINE

## 2018-03-22 PROCEDURE — G8427 DOCREV CUR MEDS BY ELIG CLIN: HCPCS | Performed by: INTERNAL MEDICINE

## 2018-03-22 PROCEDURE — 99214 OFFICE O/P EST MOD 30 MIN: CPT | Performed by: INTERNAL MEDICINE

## 2018-03-22 NOTE — PROGRESS NOTES
Liraglutide (VICTOZA) 18 MG/3ML SOPN SC injection Inject 1.2 mg into the skin daily 1 Pen 0    Blood Glucose Monitoring Suppl (ONE TOUCH ULTRA 2) W/DEVICE KIT Check blood sugar 6 x daily (Dx: E11.65) 1 kit 0    glucose blood VI test strips (ONE TOUCH ULTRA TEST) strip Check blood sugar 6 x daily (Dx: E11.65) 200 each 5    Lancets MISC Check blood sugar 6 x daily (Dx: E11.65) 200 each 5    glucagon 1 MG injection Inject 1 mg into the skin See Admin Instructions Follow package directions for low blood sugar. 1 kit 3    venlafaxine (EFFEXOR-XR) 150 MG XR capsule Take 150 mg by mouth daily      acetaminophen (TYLENOL) 325 MG tablet Take 650 mg by mouth every 6 hours as needed for Pain      Loratadine (CLARITIN PO) Take by mouth daily as needed Taking generic form and Non drowsy formula       Pregabalin (LYRICA PO) Take 150 mg by mouth 2 times daily       atorvastatin (LIPITOR) 20 MG tablet Take 20 mg by mouth daily.  lisinopril-hydrochlorothiazide (PRINZIDE;ZESTORETIC) 20-12.5 MG per tablet Take 1 tablet by mouth 2 times daily.  traZODone (DESYREL) 50 MG tablet Take 50 mg by mouth nightly.  aspirin 81 MG tablet Take 81 mg by mouth daily. No current facility-administered medications for this visit.         Allergies   Allergen Reactions    Coconut Flavor      (real coconut)    Nuts [Peanut-Containing Drug Products]      Walnuts only       Social History     Social History    Marital status:      Spouse name: N/A    Number of children: N/A    Years of education: N/A     Occupational History    Works [employer]      Social History Main Topics    Smoking status: Former Smoker     Packs/day: 0.20     Years: 5.00     Types: Cigarettes     Quit date: 3/4/2000    Smokeless tobacco: Never Used    Alcohol use No    Drug use: No    Sexual activity: Not on file     Other Topics Concern    Not on file     Social History Narrative    No narrative on file     4 Carolyn  Tentatively follow-up with me yearly    Allergies: Coconut flavor and Nuts [peanut-containing drug products]     Electronically signed by Nichole Reynoso MD on 3/22/2018 at 12:15 PM

## 2018-03-23 NOTE — PROGRESS NOTES
[facility] at [address] [place], [address]  [phone] (phone)  [phone] (fax)    Patient ID: Jeannie Wang [id number]  Referring Provider: Dr. Beka Santana    Patient's name and  were verified. Subjective:    She presents for Her follow-up diabetic visit. She has type 2 diabetes mellitus. Home regimen includes: insulin  GLP-1 agonist She is noncompliant some of the time. Assessment:     Lab Results   Component Value Date    LABA1C 8.5 2017    BUN 18 2017    CREATININE 0.6 2017     Vitals:    10/18/17 5949   Weight: 268 lb 3.2 oz (121.7 kg)   Height: 5' 7\" (1.702 m)     Wt Readings from Last 3 Encounters:   10/18/17 268 lb 3.2 oz (121.7 kg)   17 273 lb 12.8 oz (124.2 kg)   17 271 lb 12.8 oz (123.3 kg)     Ht Readings from Last 3 Encounters:   10/18/17 5' 7\" (1.702 m)   17 5' 7\" (1.702 m)   17 5' 6.93\" (1.7 m)       Glucose at FBS today resulted at 146mg/dl  Current monitoring regimen: home blood tests - 3-4 times daily  Home blood sugar trends: see insulin pump download  Any episodes of hypoglycemia? yes - 1-2 times per week-highest risk afternoon at work  Previous visit with dietician: yes  Current diet: B 6am-- 5 of 7  Scr eggs/ toast or oatmeal                       L 11am--salad-vending machine; TV dinner                       D 5:30-7pm --chili; bread / peanut butter                       Try to keep it 40-45 grams                       Snacking --rare. Crackers/ carrots at 9:30a  Current exercise: Walk all day at work; 5 days per week  Eye exam current (within one year): yes Dr. Adriana Castillo 10/9/17. Dr. Raegan Sahni 10/11/17 ++new Glaucoma. F/u appts 10/27/17-Dr. Raegan Sahni w/ possible laser  Any history of foot problems? Yes- neurapathy   Last foot exam: 2017 per [facility] [place]  Immunizations up to date: yes -   Taking ASA:  Yes   Appropriate for use of MyChart Glucose Grid:  No    Focus: Follow up pump visit.  Blood sugars have improved with some variability in readings. She is taking Victoza 1.8mg and is struggling with nausea-calling off work last week. She states she tolerated 1.2mg well-will return to that dosing for patient comfort/ compliance. Aiden Rosa is active at work, but not exercise program in place. She reports counting carbs and bolusing for all carb intake. Suspect she may miss bolusing for a random snack or meal. There is not enough pattern of glucose results to support any changes. Dee states she does not internet at this time, eliminating ability to download pump from home. Reinforced the need to contact the clinic if BS's above goal per pattern. Follow up 2 months with HEIDI.    PHIL PLAN:   Discussed general issues about diabetes pathophysiology and management. Counseling at today's visit: carb counting; exercise; temp basal for increased activityr. 1. Make sure you are bolusing for ALL meals and snacks              Really try to stay on schedule as much as possible  2. Set a goal to do resistance exercise every evening 15-20 minutes while watching TV                      -stretch band with arms and legs  3. Decrease Victoza to 1.2 mg daily to avoid nausea  4. 3 meals per day --40-45 grams carb per meal  IF you are having BS's above goal over 50% of the time or having 2-3 or more lows per week             =call the clinic    Meter download, medications, PMH and nursing assessment reviewed with Scott Antonio, Pharm D. Morgan Jeff states She is willing to participate in this plan of care and verbalized understanding of all instructions provided. Teach back used to verify comprehension. Total time involved in direct patient education: 60 minutes. Yes

## 2018-03-27 ENCOUNTER — OFFICE VISIT (OUTPATIENT)
Dept: INTERNAL MEDICINE CLINIC | Age: 48
End: 2018-03-27
Payer: COMMERCIAL

## 2018-03-27 VITALS — BODY MASS INDEX: 43.26 KG/M2 | HEIGHT: 67 IN | WEIGHT: 275.6 LBS

## 2018-03-27 DIAGNOSIS — Z79.4 TYPE 2 DIABETES MELLITUS WITH COMPLICATION, WITH LONG-TERM CURRENT USE OF INSULIN (HCC): Primary | ICD-10-CM

## 2018-03-27 DIAGNOSIS — E11.8 TYPE 2 DIABETES MELLITUS WITH COMPLICATION, WITH LONG-TERM CURRENT USE OF INSULIN (HCC): Primary | ICD-10-CM

## 2018-03-27 PROCEDURE — G0108 DIAB MANAGE TRN  PER INDIV: HCPCS | Performed by: NURSE PRACTITIONER

## 2018-03-27 PROCEDURE — G8417 CALC BMI ABV UP PARAM F/U: HCPCS | Performed by: NURSE PRACTITIONER

## 2018-03-27 PROCEDURE — 3045F PR MOST RECENT HEMOGLOBIN A1C LEVEL 7.0-9.0%: CPT | Performed by: NURSE PRACTITIONER

## 2018-03-27 PROCEDURE — G8427 DOCREV CUR MEDS BY ELIG CLIN: HCPCS | Performed by: NURSE PRACTITIONER

## 2018-04-03 ENCOUNTER — TELEPHONE (OUTPATIENT)
Dept: INTERNAL MEDICINE CLINIC | Age: 48
End: 2018-04-03

## 2018-05-01 DIAGNOSIS — Z79.4 TYPE 2 DIABETES MELLITUS WITH HYPERGLYCEMIA, WITH LONG-TERM CURRENT USE OF INSULIN (HCC): ICD-10-CM

## 2018-05-01 DIAGNOSIS — E11.65 TYPE 2 DIABETES MELLITUS WITH HYPERGLYCEMIA, WITH LONG-TERM CURRENT USE OF INSULIN (HCC): ICD-10-CM

## 2018-05-15 ENCOUNTER — TELEPHONE (OUTPATIENT)
Dept: INTERNAL MEDICINE CLINIC | Age: 48
End: 2018-05-15

## 2018-05-21 ENCOUNTER — TELEPHONE (OUTPATIENT)
Dept: INTERNAL MEDICINE CLINIC | Age: 48
End: 2018-05-21

## 2018-06-07 ENCOUNTER — OFFICE VISIT (OUTPATIENT)
Dept: INTERNAL MEDICINE CLINIC | Age: 48
End: 2018-06-07
Payer: COMMERCIAL

## 2018-06-07 VITALS
WEIGHT: 270 LBS | DIASTOLIC BLOOD PRESSURE: 78 MMHG | BODY MASS INDEX: 42.38 KG/M2 | HEART RATE: 87 BPM | HEIGHT: 67 IN | SYSTOLIC BLOOD PRESSURE: 124 MMHG | RESPIRATION RATE: 16 BRPM

## 2018-06-07 DIAGNOSIS — Z79.4 TYPE 2 DIABETES MELLITUS WITH COMPLICATION, WITH LONG-TERM CURRENT USE OF INSULIN (HCC): Primary | ICD-10-CM

## 2018-06-07 DIAGNOSIS — E55.9 HYPOVITAMINOSIS D: ICD-10-CM

## 2018-06-07 DIAGNOSIS — E11.8 TYPE 2 DIABETES MELLITUS WITH COMPLICATION, WITH LONG-TERM CURRENT USE OF INSULIN (HCC): Primary | ICD-10-CM

## 2018-06-07 DIAGNOSIS — E78.2 MIXED HYPERLIPIDEMIA: ICD-10-CM

## 2018-06-07 DIAGNOSIS — E66.01 MORBID OBESITY WITH BMI OF 40.0-44.9, ADULT (HCC): ICD-10-CM

## 2018-06-07 PROCEDURE — 99214 OFFICE O/P EST MOD 30 MIN: CPT | Performed by: CLINICAL NURSE SPECIALIST

## 2018-06-07 PROCEDURE — 2022F DILAT RTA XM EVC RTNOPTHY: CPT | Performed by: CLINICAL NURSE SPECIALIST

## 2018-06-07 PROCEDURE — 1036F TOBACCO NON-USER: CPT | Performed by: CLINICAL NURSE SPECIALIST

## 2018-06-07 PROCEDURE — G0444 DEPRESSION SCREEN ANNUAL: HCPCS | Performed by: CLINICAL NURSE SPECIALIST

## 2018-06-07 PROCEDURE — G8427 DOCREV CUR MEDS BY ELIG CLIN: HCPCS | Performed by: CLINICAL NURSE SPECIALIST

## 2018-06-07 PROCEDURE — 3045F PR MOST RECENT HEMOGLOBIN A1C LEVEL 7.0-9.0%: CPT | Performed by: CLINICAL NURSE SPECIALIST

## 2018-06-07 PROCEDURE — G8417 CALC BMI ABV UP PARAM F/U: HCPCS | Performed by: CLINICAL NURSE SPECIALIST

## 2018-06-07 RX ORDER — SULFAMETHOXAZOLE AND TRIMETHOPRIM 800; 160 MG/1; MG/1
1 TABLET ORAL ONCE
COMMUNITY
End: 2018-06-26 | Stop reason: ALTCHOICE

## 2018-06-07 RX ORDER — METOPROLOL SUCCINATE 25 MG/1
25 TABLET, EXTENDED RELEASE ORAL DAILY
COMMUNITY

## 2018-06-07 RX ORDER — HYDROMORPHONE HYDROCHLORIDE 2 MG/1
2 TABLET ORAL EVERY 6 HOURS PRN
COMMUNITY
End: 2018-12-03 | Stop reason: ALTCHOICE

## 2018-06-07 RX ORDER — FLASH GLUCOSE SENSOR
KIT MISCELLANEOUS
Qty: 3 EACH | Refills: 3 | Status: SHIPPED | OUTPATIENT
Start: 2018-06-07 | End: 2018-06-15 | Stop reason: RX

## 2018-06-07 RX ORDER — ISOSORBIDE MONONITRATE 30 MG/1
30 TABLET, EXTENDED RELEASE ORAL DAILY
COMMUNITY

## 2018-06-07 RX ORDER — NITROGLYCERIN 0.4 MG/1
0.4 TABLET SUBLINGUAL EVERY 5 MIN PRN
COMMUNITY

## 2018-06-07 RX ORDER — LISINOPRIL 20 MG/1
10 TABLET ORAL DAILY
COMMUNITY
End: 2020-03-05 | Stop reason: DRUGHIGH

## 2018-06-07 RX ORDER — FLASH GLUCOSE SENSOR
KIT MISCELLANEOUS
Qty: 1 DEVICE | Refills: 0 | Status: SHIPPED | OUTPATIENT
Start: 2018-06-07 | End: 2018-06-15 | Stop reason: RX

## 2018-06-07 RX ORDER — HYDROCODONE BITARTRATE AND ACETAMINOPHEN 10; 325 MG/1; MG/1
1 TABLET ORAL EVERY 6 HOURS PRN
COMMUNITY
End: 2018-10-29 | Stop reason: ALTCHOICE

## 2018-06-07 ASSESSMENT — ENCOUNTER SYMPTOMS
DIARRHEA: 0
COUGH: 0
CONSTIPATION: 0
NAUSEA: 0
WHEEZING: 0
ABDOMINAL PAIN: 0
VOICE CHANGE: 0
SHORTNESS OF BREATH: 0
CHEST TIGHTNESS: 0
EYE REDNESS: 0

## 2018-06-07 ASSESSMENT — PATIENT HEALTH QUESTIONNAIRE - PHQ9
10. IF YOU CHECKED OFF ANY PROBLEMS, HOW DIFFICULT HAVE THESE PROBLEMS MADE IT FOR YOU TO DO YOUR WORK, TAKE CARE OF THINGS AT HOME, OR GET ALONG WITH OTHER PEOPLE: 2
4. FEELING TIRED OR HAVING LITTLE ENERGY: 3
9. THOUGHTS THAT YOU WOULD BE BETTER OFF DEAD, OR OF HURTING YOURSELF: 1
1. LITTLE INTEREST OR PLEASURE IN DOING THINGS: 2
7. TROUBLE CONCENTRATING ON THINGS, SUCH AS READING THE NEWSPAPER OR WATCHING TELEVISION: 3
3. TROUBLE FALLING OR STAYING ASLEEP: 3
8. MOVING OR SPEAKING SO SLOWLY THAT OTHER PEOPLE COULD HAVE NOTICED. OR THE OPPOSITE, BEING SO FIGETY OR RESTLESS THAT YOU HAVE BEEN MOVING AROUND A LOT MORE THAN USUAL: 0
6. FEELING BAD ABOUT YOURSELF - OR THAT YOU ARE A FAILURE OR HAVE LET YOURSELF OR YOUR FAMILY DOWN: 2
2. FEELING DOWN, DEPRESSED OR HOPELESS: 2
5. POOR APPETITE OR OVEREATING: 3
SUM OF ALL RESPONSES TO PHQ QUESTIONS 1-9: 19
SUM OF ALL RESPONSES TO PHQ9 QUESTIONS 1 & 2: 4

## 2018-06-26 ENCOUNTER — OFFICE VISIT (OUTPATIENT)
Dept: INTERNAL MEDICINE CLINIC | Age: 48
End: 2018-06-26
Payer: COMMERCIAL

## 2018-06-26 VITALS
WEIGHT: 265 LBS | SYSTOLIC BLOOD PRESSURE: 110 MMHG | DIASTOLIC BLOOD PRESSURE: 62 MMHG | BODY MASS INDEX: 41.59 KG/M2 | HEIGHT: 67 IN

## 2018-06-26 DIAGNOSIS — Z79.4 TYPE 2 DIABETES MELLITUS WITH OTHER SPECIFIED COMPLICATION, WITH LONG-TERM CURRENT USE OF INSULIN (HCC): ICD-10-CM

## 2018-06-26 DIAGNOSIS — E11.69 TYPE 2 DIABETES MELLITUS WITH OTHER SPECIFIED COMPLICATION, WITH LONG-TERM CURRENT USE OF INSULIN (HCC): ICD-10-CM

## 2018-06-26 PROCEDURE — G0108 DIAB MANAGE TRN  PER INDIV: HCPCS | Performed by: NURSE PRACTITIONER

## 2018-06-26 PROCEDURE — 99999 PR OFFICE/OUTPT VISIT,PROCEDURE ONLY: CPT | Performed by: NURSE PRACTITIONER

## 2018-06-26 RX ORDER — ASCORBIC ACID 500 MG
500 TABLET ORAL DAILY
COMMUNITY
End: 2019-06-10 | Stop reason: DRUGHIGH

## 2018-07-31 ENCOUNTER — OFFICE VISIT (OUTPATIENT)
Dept: INTERNAL MEDICINE CLINIC | Age: 48
End: 2018-07-31
Payer: COMMERCIAL

## 2018-07-31 VITALS
SYSTOLIC BLOOD PRESSURE: 102 MMHG | WEIGHT: 267.8 LBS | HEIGHT: 67 IN | BODY MASS INDEX: 42.03 KG/M2 | DIASTOLIC BLOOD PRESSURE: 60 MMHG

## 2018-07-31 DIAGNOSIS — Z79.4 TYPE 2 DIABETES MELLITUS WITH OTHER SPECIFIED COMPLICATION, WITH LONG-TERM CURRENT USE OF INSULIN (HCC): ICD-10-CM

## 2018-07-31 DIAGNOSIS — E11.69 TYPE 2 DIABETES MELLITUS WITH OTHER SPECIFIED COMPLICATION, WITH LONG-TERM CURRENT USE OF INSULIN (HCC): ICD-10-CM

## 2018-07-31 PROCEDURE — 99999 PR OFFICE/OUTPT VISIT,PROCEDURE ONLY: CPT | Performed by: REGISTERED NURSE

## 2018-07-31 PROCEDURE — G0108 DIAB MANAGE TRN  PER INDIV: HCPCS | Performed by: NURSE PRACTITIONER

## 2018-07-31 NOTE — PROGRESS NOTES
[facility]  [address] [facility], [address]  [phone] (phone)  [phone] (fax)    Patient ID: Raz Cevallos [id number]  Referring Provider: BURAK Jiménez  CNP     Patient's name and  were verified. Subjective:    She presents for Her follow-up diabetic visit. She has type 2 diabetes mellitus. Home regimen includes: insulin She is compliant most of the time. Assessment:     Lab Results   Component Value Date    LABA1C 7.7 2018    BUN 11 2017    CREATININE 0.5 2017     Vitals:    18 1308   Weight: 267 lb 12.8 oz (121.5 kg)   Height: 5' 7\" (1.702 m)     Wt Readings from Last 3 Encounters:   18 267 lb 12.8 oz (121.5 kg)   18 265 lb (120.2 kg)   18 270 lb (122.5 kg)     Ht Readings from Last 3 Encounters:   18 5' 7\" (1.702 m)   18 5' 7\" (1.702 m)   18 5' 7.01\" (1.702 m)       Current monitoring regimen: home blood tests - 4 times daily  Home blood sugar trends: FBS's , 143. Noon . Dinner 105-191. -254  Any episodes of hypoglycemia? yes - 1-2 per week; AC lunch  Previous visit with dietician: yes   Current diet: B-eggs/ toast/ ocass banana or orange                       L- skips 20% of the time  Salad, chicken, fish                       D-pasta or meatloaf/ potato veg                       Random snacks--bolusing for any carb snacks  Current exercise: ADL's-low to moderate activity. limited by chest inicision  Eye exam current (within one year): yes 2018 Dr. Law Villanueva. Pressures stable. F/u 6 mos. Any history of foot problems? No. History of toe amputation Rt 2nd toe  Last foot exam: 2018  Immunizations up to date: yes -   Taking ASA:  Yes   Appropriate for use of MyChart Glucose Grid:  Yes    Focus: Follow up insulin pump visit. Dee's BS's have improved significantly since her last visit.  When asked how-she reports that she has been having family stay with her since surgery and have been limiting

## 2018-08-02 ENCOUNTER — TELEPHONE (OUTPATIENT)
Dept: INTERNAL MEDICINE CLINIC | Age: 48
End: 2018-08-02

## 2018-08-02 NOTE — TELEPHONE ENCOUNTER
Dee called. Her insurance company has states that she needs to change her insulin in her pump to Admelog instead of Novolog. Dee states she would rather stay on Novolog, but when the PA and possible higher copay, she states what ever is decided. Please follow up with script for Admelog if agree with this change. 110-160 units daily via insulin pump.  [facility] [address]

## 2018-09-10 ENCOUNTER — OFFICE VISIT (OUTPATIENT)
Dept: INTERNAL MEDICINE CLINIC | Age: 48
End: 2018-09-10
Payer: COMMERCIAL

## 2018-09-10 ENCOUNTER — TELEPHONE (OUTPATIENT)
Dept: INTERNAL MEDICINE CLINIC | Age: 48
End: 2018-09-10

## 2018-09-10 VITALS
SYSTOLIC BLOOD PRESSURE: 136 MMHG | WEIGHT: 273.8 LBS | HEART RATE: 78 BPM | RESPIRATION RATE: 14 BRPM | BODY MASS INDEX: 42.88 KG/M2 | DIASTOLIC BLOOD PRESSURE: 82 MMHG

## 2018-09-10 DIAGNOSIS — E11.69 TYPE 2 DIABETES MELLITUS WITH OTHER SPECIFIED COMPLICATION, WITH LONG-TERM CURRENT USE OF INSULIN (HCC): Primary | ICD-10-CM

## 2018-09-10 DIAGNOSIS — I10 ESSENTIAL HYPERTENSION: ICD-10-CM

## 2018-09-10 DIAGNOSIS — E66.01 OBESITY, MORBID, BMI 40.0-49.9 (HCC): ICD-10-CM

## 2018-09-10 DIAGNOSIS — Z79.4 TYPE 2 DIABETES MELLITUS WITH OTHER SPECIFIED COMPLICATION, WITH LONG-TERM CURRENT USE OF INSULIN (HCC): Primary | ICD-10-CM

## 2018-09-10 DIAGNOSIS — E78.2 MIXED HYPERLIPIDEMIA: ICD-10-CM

## 2018-09-10 PROCEDURE — 3045F PR MOST RECENT HEMOGLOBIN A1C LEVEL 7.0-9.0%: CPT | Performed by: INTERNAL MEDICINE

## 2018-09-10 PROCEDURE — 2022F DILAT RTA XM EVC RTNOPTHY: CPT | Performed by: INTERNAL MEDICINE

## 2018-09-10 PROCEDURE — 99204 OFFICE O/P NEW MOD 45 MIN: CPT | Performed by: INTERNAL MEDICINE

## 2018-09-10 PROCEDURE — G8417 CALC BMI ABV UP PARAM F/U: HCPCS | Performed by: INTERNAL MEDICINE

## 2018-09-10 PROCEDURE — G8427 DOCREV CUR MEDS BY ELIG CLIN: HCPCS | Performed by: INTERNAL MEDICINE

## 2018-09-10 PROCEDURE — 1036F TOBACCO NON-USER: CPT | Performed by: INTERNAL MEDICINE

## 2018-09-10 RX ORDER — FUROSEMIDE 20 MG/1
20 TABLET ORAL 2 TIMES DAILY
COMMUNITY
End: 2021-05-12 | Stop reason: DRUGHIGH

## 2018-09-10 NOTE — PROGRESS NOTES
solution Place 1 drop into both eyes every 12 hours      Blood Glucose Monitoring Suppl (ONE TOUCH ULTRA 2) W/DEVICE KIT Check blood sugar 6 x daily (Dx: E11.65) 1 kit 0    glucose blood VI test strips (ONE TOUCH ULTRA TEST) strip Check blood sugar 6 x daily (Dx: E11.65) 200 each 5    Lancets MISC Check blood sugar 6 x daily (Dx: E11.65) 200 each 5    glucagon 1 MG injection Inject 1 mg into the skin See Admin Instructions Follow package directions for low blood sugar. 1 kit 3    venlafaxine (EFFEXOR-XR) 150 MG XR capsule Take 225 mg by mouth daily       acetaminophen (TYLENOL) 325 MG tablet Take 650 mg by mouth every 6 hours as needed for Pain      Pregabalin (LYRICA PO) Take 150 mg by mouth 2 times daily       atorvastatin (LIPITOR) 20 MG tablet Take 20 mg by mouth daily.  traZODone (DESYREL) 50 MG tablet Take 50 mg by mouth nightly.  aspirin 81 MG tablet Take 81 mg by mouth daily.  insulin aspart (NOVOLOG) 100 UNIT/ML injection vial PER INSULIN PUMP ( UNITS DAILY) 5 vial 3    latanoprost (XALATAN) 0.005 % ophthalmic solution Place 1 drop into both eyes daily  0    Loratadine (CLARITIN PO) Take by mouth daily as needed Taking generic form and Non drowsy formula        No current facility-administered medications for this visit.         Past Surgical History:   Procedure Laterality Date    DILATION AND CURETTAGE OF UTERUS      GLAUCOMA SURGERY  10/27/2017    TOE AMPUTATION Right 01/30/2018    TOE SURGERY      Cut tendon    TOE SURGERY Left 03/17/2017    third toe  Dr. Renny Menezes   CABG 4/18    Allergies   Allergen Reactions    Coconut Flavor      (real coconut)    Nuts [Peanut-Containing Drug Products]      Walnuts only       Social History     Social History    Marital status:      Spouse name: N/A    Number of children: N/A    Years of education: N/A     Occupational History    Works [email or web address] noted   DIABETIC FOOT EXAM    SENSATION: absent soles bilaterally  PROPRIOCEPTION: intact  VIBRATORY SENSE: decreased right  No  ulcers, erythema or other lesions noted  Pulses present and normal       Diagnosis Orders   1. Type 2 diabetes mellitus with other specified complication, with long-term current use of insulin ([facility])   DIABETES FOOT EXAM   2. Mixed hyperlipidemia     3. Essential hypertension     4. Obesity, morbid, BMI 40.0-49.9 ([address]         Orders Placed This Encounter   Procedures     DIABETES FOOT EXAM     Extensive counseling was done regarding diabetes. The goals are to decrease or prevent the complications of diabetes and improve survival. The following goals were discussed  HgbA1c < 7 (providing no hypoglycemia)    BMI  18-25    BP < 130/80    STATIN(medium or high risk)    DIET <20% protein  < 30% fat, no trans fats, calorie restricted if BMI high    URINE MICROALBUMIN/CREATINIE  < 30     DILATED EYE EXAM EVERY 1-2 YEARS    MONITOR FEET FOR SORES, NEUROPATHY, ETC    REGULAR DENTAL CARE    BMI 42.88  Discussed diet and weight loss at length with the patient. Explained that calories are the vega. A 1500 kcal diet should result in about a 1 pound loss per 1-2 weeks, even more with exercise added  Exercise should be 150 minutes per week with a goal to burn 2500 kcal per week  Ideal BMI is 18-25  Achieving ideal BMI reduces cardiovascular risk as well as helping decrease some of the complications of diabetes.   Last hemoglobin A1c that I see in February 7 7  She is followed by Donita Stokes  I told her I would see her every 6 months

## 2018-09-10 NOTE — TELEPHONE ENCOUNTER
Patient was in the office today stating that Novolog was requiring a prior Don Aaliyahler through [place]. Humalog is preferred but doesn't work for her. In order to get the prior auth info, Rx for Novolog needs to be sent to the pharmacy. Pharmacy will then send us the request needed for Novolog prior auth.

## 2018-09-12 ENCOUNTER — TELEPHONE (OUTPATIENT)
Dept: INTERNAL MEDICINE CLINIC | Age: 48
End: 2018-09-12

## 2018-09-25 LAB — VITAMIN D 25-HYDROXY: 26.75 NG/ML (ref 30–100)

## 2018-10-22 ENCOUNTER — TELEPHONE (OUTPATIENT)
Dept: INTERNAL MEDICINE CLINIC | Age: 48
End: 2018-10-22

## 2018-10-22 NOTE — TELEPHONE ENCOUNTER
Follow up call to Dee for update on BS control. Spoke with Dee on  10/8/2018. She reported BS's at that time:        FBS's \"less than 140\". Noon \"[phone]\"        Dinner \"300\" BT random/ variable  Dee reports just increasing her daytime basal rate and that BS's are doing better. She agrees to call the clinic in 1 week with update. Call to Dee today. There has been no update on her blood sugar results. Left message for Dee to update the clinic with current glucose results.

## 2018-10-29 ENCOUNTER — OFFICE VISIT (OUTPATIENT)
Dept: INTERNAL MEDICINE CLINIC | Age: 48
End: 2018-10-29
Payer: COMMERCIAL

## 2018-10-29 VITALS
SYSTOLIC BLOOD PRESSURE: 102 MMHG | DIASTOLIC BLOOD PRESSURE: 44 MMHG | WEIGHT: 273.8 LBS | BODY MASS INDEX: 42.97 KG/M2 | HEIGHT: 67 IN

## 2018-10-29 DIAGNOSIS — Z79.4 TYPE 2 DIABETES MELLITUS WITH OTHER SPECIFIED COMPLICATION, WITH LONG-TERM CURRENT USE OF INSULIN (HCC): ICD-10-CM

## 2018-10-29 DIAGNOSIS — E11.69 TYPE 2 DIABETES MELLITUS WITH OTHER SPECIFIED COMPLICATION, WITH LONG-TERM CURRENT USE OF INSULIN (HCC): ICD-10-CM

## 2018-10-29 PROCEDURE — G0108 DIAB MANAGE TRN  PER INDIV: HCPCS | Performed by: INTERNAL MEDICINE

## 2018-10-29 PROCEDURE — 99999 PR OFFICE/OUTPT VISIT,PROCEDURE ONLY: CPT | Performed by: INTERNAL MEDICINE

## 2018-10-29 NOTE — PROGRESS NOTES
ADL's. She is unable to currently participate in cardiac rehab. And she is in pain. This is lending to extra snacking, inaccurate carb counting and omission of some boluses for carbs eaten. She is also struggling to eat healthy as she is working with  1 arm/ hand (and not her dominant arm). Discussed simple/ nutritious snack and meal options-some daughter could prep ahead. Much encouragement given. Dee agrees to pull meal plan together, bolus for all carb intake/ accurate carb counting and she will download again in 1 week for review. DSME PLAN:   Discussed general issues about diabetes pathophysiology and management. Counseling at today's visit: BG goals; carb counting; carb coverage; exercise. 1.  Make sure you are aware of everything you are eating--measure your food when you can          Add up what you eating. Bolus for it. Enjoy. All meals and any snacks. (unless you are treating a low blood sugar)  2. Try to exercise every day. Marcus Smiles in the chair--10 minutes--legs only--low impact  3. Check blood sugars 3-4 times per day. Goal is   4. I approximately 1 week:  Download insulin pump for review --at mom's house or come by the clinic      Meter download, medications, PMH and nursing assessment reviewed. Cori Turpin states She is willing to participate in this plan of care and verbalized understanding of all instructions provided. Teach back used to verify comprehension. Total time involved in direct patient education: 60 minutes.

## 2018-11-29 DIAGNOSIS — E11.69 TYPE 2 DIABETES MELLITUS WITH OTHER SPECIFIED COMPLICATION, WITH LONG-TERM CURRENT USE OF INSULIN (HCC): Primary | ICD-10-CM

## 2018-11-29 DIAGNOSIS — Z79.4 TYPE 2 DIABETES MELLITUS WITH OTHER SPECIFIED COMPLICATION, WITH LONG-TERM CURRENT USE OF INSULIN (HCC): Primary | ICD-10-CM

## 2018-11-29 NOTE — TELEPHONE ENCOUNTER
Pharmacy called office requesting Rx for [employer] Next testing supplies to check BS 6 x daily.  Last office visit was 9/10/18, next appt is 3/12/19

## 2018-11-30 RX ORDER — LANCETS 30 GAUGE
EACH MISCELLANEOUS
Qty: 600 EACH | Refills: 3 | Status: SHIPPED | OUTPATIENT
Start: 2018-11-30 | End: 2018-12-27 | Stop reason: SDUPTHER

## 2018-12-03 ENCOUNTER — OFFICE VISIT (OUTPATIENT)
Dept: INTERNAL MEDICINE CLINIC | Age: 48
End: 2018-12-03
Payer: COMMERCIAL

## 2018-12-03 VITALS
WEIGHT: 268.4 LBS | SYSTOLIC BLOOD PRESSURE: 108 MMHG | HEIGHT: 67 IN | DIASTOLIC BLOOD PRESSURE: 62 MMHG | BODY MASS INDEX: 42.12 KG/M2

## 2018-12-03 DIAGNOSIS — E11.69 TYPE 2 DIABETES MELLITUS WITH OTHER SPECIFIED COMPLICATION, WITH LONG-TERM CURRENT USE OF INSULIN (HCC): ICD-10-CM

## 2018-12-03 DIAGNOSIS — E11.65 TYPE 2 DIABETES MELLITUS WITH HYPERGLYCEMIA, WITH LONG-TERM CURRENT USE OF INSULIN (HCC): ICD-10-CM

## 2018-12-03 DIAGNOSIS — Z79.4 TYPE 2 DIABETES MELLITUS WITH OTHER SPECIFIED COMPLICATION, WITH LONG-TERM CURRENT USE OF INSULIN (HCC): ICD-10-CM

## 2018-12-03 DIAGNOSIS — Z79.4 TYPE 2 DIABETES MELLITUS WITH HYPERGLYCEMIA, WITH LONG-TERM CURRENT USE OF INSULIN (HCC): ICD-10-CM

## 2018-12-03 PROCEDURE — G0108 DIAB MANAGE TRN  PER INDIV: HCPCS | Performed by: INTERNAL MEDICINE

## 2018-12-03 NOTE — PATIENT INSTRUCTIONS
1. Look up all of your carbohydrates counts                --get accurate carb counting!!          -measure if you need to/ when you can  2. Bolus for all meals and all snacks --even if only 10 grams carb  3. Eat 3 meals every day                  --even if a meal is more like a snack (light) --  Yogurt and fruit or cottage cheese and fruit                               Or slice peanut butter toast and fruit, etc  4. Blood sugar goal                 IF you are not getting these blood sugar results at least 50% of the time = call the clinic                      To discuss changes in insulin pump setting--you will need to download your pump  5. Always carry treatment for low blood sugar  6.  Try walking 15-20 minutes most days  Remember-you need better blood sugars for your arm and your incision to heal!!

## 2018-12-03 NOTE — PROGRESS NOTES
[facility]  [address] [address], [facility], [address]  [phone] (phone)  [phone] (fax)    Patient ID: Cori Turpin 1970  Referring Provider: Dr. Katelyn Carr     Patient's name and  were verified. Subjective:    She presents for Her follow-up diabetic visit. She has type 2 diabetes mellitus. Home regimen includes: insulin She is noncompliant some of the time. Assessment:     Lab Results   Component Value Date    LABA1C 7.7 2018    BUN 9 2018    CREATININE 0.72 2018     Vitals:    18 1014   Weight: 268 lb 6.4 oz (121.7 kg)   Height: 5' 7\" (1.702 m)     Wt Readings from Last 3 Encounters:   18 268 lb 6.4 oz (121.7 kg)   10/29/18 273 lb 12.8 oz (124.2 kg)   09/10/18 273 lb 12.8 oz (124.2 kg)     Ht Readings from Last 3 Encounters:   18 5' 7\" (1.702 m)   10/29/18 5' 7\" (1.702 m)   18 5' 7\" (1.702 m)       Current monitoring regimen: home blood tests - 3-4 times daily  Home blood sugar trends: See insulin pump download  Any episodes of hypoglycemia? no  Previous visit with dietician: yes  Current diet: B- banana/ egg/ coffee  30 grams carb                       L- spagetti/ salad/ 1/2 bun (garlic bread)                       D- chicken sandwich/ 1/4 cup mac salad/ bowl of chili                       Snacking- random popcorn/ banana/ apple  Current exercise: ADL's-low to moderate activity  Eye exam current (within one year): yes 10/2018 Dr. Noemí Benson 6 mos  For Glaucoma  Any history of foot problems? Yes- recent toe amputation  Last foot exam: Dr. Benedict Francis 2018-follow up every 6 mos  Immunizations up to date: yes -   Taking ASA:  Yes   Appropriate for use of MyChart Glucose Grid:  Yes    Focus: Follow up insulin pump visit. Dee's incision on her chest (bypass surgery) continues to heal, her foot is healed s/p toe amputation and incision Rt upper arm is healing well. Blood sugars are above most of the time and most recent A1C is 8.0%.

## 2018-12-27 DIAGNOSIS — E11.69 TYPE 2 DIABETES MELLITUS WITH OTHER SPECIFIED COMPLICATION, WITH LONG-TERM CURRENT USE OF INSULIN (HCC): ICD-10-CM

## 2018-12-27 DIAGNOSIS — Z79.4 TYPE 2 DIABETES MELLITUS WITH OTHER SPECIFIED COMPLICATION, WITH LONG-TERM CURRENT USE OF INSULIN (HCC): ICD-10-CM

## 2018-12-27 RX ORDER — LANCETS 30 GAUGE
EACH MISCELLANEOUS
Qty: 600 EACH | Refills: 3 | Status: SHIPPED | OUTPATIENT
Start: 2018-12-27 | End: 2019-08-21 | Stop reason: SDUPTHER

## 2018-12-27 RX ORDER — BLOOD-GLUCOSE METER
EACH MISCELLANEOUS
Qty: 1 KIT | Refills: 0 | Status: SHIPPED | OUTPATIENT
Start: 2018-12-27 | End: 2020-08-06

## 2019-01-17 ENCOUNTER — OFFICE VISIT (OUTPATIENT)
Dept: INTERNAL MEDICINE CLINIC | Age: 49
End: 2019-01-17
Payer: COMMERCIAL

## 2019-01-17 VITALS
BODY MASS INDEX: 42.13 KG/M2 | WEIGHT: 269 LBS | DIASTOLIC BLOOD PRESSURE: 78 MMHG | HEART RATE: 87 BPM | SYSTOLIC BLOOD PRESSURE: 118 MMHG

## 2019-01-17 DIAGNOSIS — E11.69 TYPE 2 DIABETES MELLITUS WITH OTHER SPECIFIED COMPLICATION, WITH LONG-TERM CURRENT USE OF INSULIN (HCC): Primary | ICD-10-CM

## 2019-01-17 DIAGNOSIS — Z79.4 TYPE 2 DIABETES MELLITUS WITH OTHER SPECIFIED COMPLICATION, WITH LONG-TERM CURRENT USE OF INSULIN (HCC): Primary | ICD-10-CM

## 2019-01-17 LAB — HBA1C MFR BLD: 8.6 % (ref 4.3–5.7)

## 2019-01-17 PROCEDURE — G8417 CALC BMI ABV UP PARAM F/U: HCPCS | Performed by: INTERNAL MEDICINE

## 2019-01-17 PROCEDURE — 99211 OFF/OP EST MAY X REQ PHY/QHP: CPT | Performed by: INTERNAL MEDICINE

## 2019-01-17 PROCEDURE — 3045F PR MOST RECENT HEMOGLOBIN A1C LEVEL 7.0-9.0%: CPT | Performed by: INTERNAL MEDICINE

## 2019-01-17 PROCEDURE — G8428 CUR MEDS NOT DOCUMENT: HCPCS | Performed by: INTERNAL MEDICINE

## 2019-01-17 PROCEDURE — 83036 HEMOGLOBIN GLYCOSYLATED A1C: CPT | Performed by: INTERNAL MEDICINE

## 2019-01-17 RX ORDER — CETIRIZINE HYDROCHLORIDE 10 MG/1
10 TABLET ORAL DAILY
COMMUNITY

## 2019-01-24 ENCOUNTER — HOSPITAL ENCOUNTER (OUTPATIENT)
Age: 49
Setting detail: SPECIMEN
Discharge: HOME | End: 2019-01-24
Payer: COMMERCIAL

## 2019-01-25 LAB
ABSOLUTE EOS #: 0.29 K/UL (ref 0–0.44)
ABSOLUTE IMMATURE GRANULOCYTE: <0.03 K/UL (ref 0–0.3)
ABSOLUTE LYMPH #: 2.54 K/UL (ref 1.1–3.7)
ABSOLUTE MONO #: 0.43 K/UL (ref 0.1–1.2)
ALBUMIN SERPL-MCNC: 4.1 G/DL (ref 3.5–5.2)
ALBUMIN/GLOBULIN RATIO: 1.1 (ref 1–2.5)
ALP BLD-CCNC: 89 U/L (ref 35–104)
ALT SERPL-CCNC: 14 U/L (ref 5–33)
ANION GAP SERPL CALCULATED.3IONS-SCNC: 16 MMOL/L (ref 9–17)
AST SERPL-CCNC: 16 U/L
BASOPHILS # BLD: 0 % (ref 0–2)
BASOPHILS ABSOLUTE: 0.04 K/UL (ref 0–0.2)
BILIRUB SERPL-MCNC: 0.19 MG/DL (ref 0.3–1.2)
BUN BLDV-MCNC: 15 MG/DL (ref 6–20)
BUN/CREAT BLD: ABNORMAL (ref 9–20)
CALCIUM SERPL-MCNC: 9.6 MG/DL (ref 8.6–10.4)
CHLORIDE BLD-SCNC: 98 MMOL/L (ref 98–107)
CHOLESTEROL/HDL RATIO: 3.8
CHOLESTEROL: 109 MG/DL
CO2: 27 MMOL/L (ref 20–31)
CREAT SERPL-MCNC: 0.67 MG/DL (ref 0.5–0.9)
DIFFERENTIAL TYPE: NORMAL
DIRECT EXAM: ABNORMAL
EOSINOPHILS RELATIVE PERCENT: 3 % (ref 1–4)
GFR AFRICAN AMERICAN: >60 ML/MIN
GFR NON-AFRICAN AMERICAN: >60 ML/MIN
GFR SERPL CREATININE-BSD FRML MDRD: ABNORMAL ML/MIN/{1.73_M2}
GFR SERPL CREATININE-BSD FRML MDRD: ABNORMAL ML/MIN/{1.73_M2}
GLUCOSE BLD-MCNC: 138 MG/DL (ref 70–99)
HCT VFR BLD CALC: 41.8 % (ref 36.3–47.1)
HDLC SERPL-MCNC: 29 MG/DL
HEMOGLOBIN: 13.3 G/DL (ref 11.9–15.1)
IMMATURE GRANULOCYTES: 0 %
LDL CHOLESTEROL: 47 MG/DL (ref 0–130)
LYMPHOCYTES # BLD: 28 % (ref 24–43)
Lab: ABNORMAL
MCH RBC QN AUTO: 28 PG (ref 25.2–33.5)
MCHC RBC AUTO-ENTMCNC: 31.8 G/DL (ref 28.4–34.8)
MCV RBC AUTO: 88 FL (ref 82.6–102.9)
MONOCYTES # BLD: 5 % (ref 3–12)
NRBC AUTOMATED: 0 PER 100 WBC
PDW BLD-RTO: 13.3 % (ref 11.8–14.4)
PLATELET # BLD: 358 K/UL (ref 138–453)
PLATELET ESTIMATE: NORMAL
PMV BLD AUTO: 10.8 FL (ref 8.1–13.5)
POTASSIUM SERPL-SCNC: 4.5 MMOL/L (ref 3.7–5.3)
RBC # BLD: 4.75 M/UL (ref 3.95–5.11)
RBC # BLD: NORMAL 10*6/UL
SEG NEUTROPHILS: 64 % (ref 36–65)
SEGMENTED NEUTROPHILS ABSOLUTE COUNT: 5.84 K/UL (ref 1.5–8.1)
SODIUM BLD-SCNC: 141 MMOL/L (ref 135–144)
SPECIMEN DESCRIPTION: ABNORMAL
STATUS: ABNORMAL
TOTAL PROTEIN: 7.9 G/DL (ref 6.4–8.3)
TRIGL SERPL-MCNC: 166 MG/DL
TSH SERPL DL<=0.05 MIU/L-ACNC: 1.9 MIU/L (ref 0.3–5)
VLDLC SERPL CALC-MCNC: ABNORMAL MG/DL (ref 1–30)
WBC # BLD: 9.2 K/UL (ref 3.5–11.3)
WBC # BLD: NORMAL 10*3/UL

## 2019-01-28 LAB
HPV SAMPLE: ABNORMAL
HPV SOURCE: ABNORMAL
HPV, GENOTYPE 16: NOT DETECTED
HPV, GENOTYPE 18: DETECTED
HPV, HIGH RISK OTHER: DETECTED
HPV, INTERPRETATION: ABNORMAL

## 2019-01-30 ENCOUNTER — PATIENT MESSAGE (OUTPATIENT)
Dept: INTERNAL MEDICINE CLINIC | Age: 49
End: 2019-01-30

## 2019-02-08 LAB — CYTOLOGY REPORT: NORMAL

## 2019-02-21 ENCOUNTER — OFFICE VISIT (OUTPATIENT)
Dept: INTERNAL MEDICINE CLINIC | Age: 49
End: 2019-02-21
Payer: COMMERCIAL

## 2019-02-21 VITALS
WEIGHT: 266.4 LBS | HEIGHT: 67 IN | BODY MASS INDEX: 41.81 KG/M2 | DIASTOLIC BLOOD PRESSURE: 60 MMHG | SYSTOLIC BLOOD PRESSURE: 100 MMHG

## 2019-02-21 DIAGNOSIS — E11.69 TYPE 2 DIABETES MELLITUS WITH OTHER SPECIFIED COMPLICATION, WITH LONG-TERM CURRENT USE OF INSULIN (HCC): ICD-10-CM

## 2019-02-21 DIAGNOSIS — Z79.4 TYPE 2 DIABETES MELLITUS WITH OTHER SPECIFIED COMPLICATION, WITH LONG-TERM CURRENT USE OF INSULIN (HCC): ICD-10-CM

## 2019-02-21 PROCEDURE — G0108 DIAB MANAGE TRN  PER INDIV: HCPCS | Performed by: INTERNAL MEDICINE

## 2019-02-21 RX ORDER — VENLAFAXINE HYDROCHLORIDE 75 MG/1
225 CAPSULE, EXTENDED RELEASE ORAL DAILY
Refills: 1 | COMMUNITY
Start: 2019-01-29

## 2019-02-25 ENCOUNTER — TELEPHONE (OUTPATIENT)
Dept: FAMILY MEDICINE CLINIC | Age: 49
End: 2019-02-25

## 2019-04-01 ENCOUNTER — OFFICE VISIT (OUTPATIENT)
Dept: INTERNAL MEDICINE CLINIC | Age: 49
End: 2019-04-01
Payer: COMMERCIAL

## 2019-04-01 VITALS
WEIGHT: 264 LBS | BODY MASS INDEX: 41.44 KG/M2 | HEIGHT: 67 IN | SYSTOLIC BLOOD PRESSURE: 138 MMHG | DIASTOLIC BLOOD PRESSURE: 64 MMHG | HEART RATE: 84 BPM

## 2019-04-01 DIAGNOSIS — I10 ESSENTIAL HYPERTENSION: ICD-10-CM

## 2019-04-01 DIAGNOSIS — E66.01 MORBID OBESITY WITH BMI OF 40.0-44.9, ADULT (HCC): ICD-10-CM

## 2019-04-01 DIAGNOSIS — Z79.4 TYPE 2 DIABETES MELLITUS WITH OTHER SPECIFIED COMPLICATION, WITH LONG-TERM CURRENT USE OF INSULIN (HCC): Primary | ICD-10-CM

## 2019-04-01 DIAGNOSIS — E11.69 TYPE 2 DIABETES MELLITUS WITH OTHER SPECIFIED COMPLICATION, WITH LONG-TERM CURRENT USE OF INSULIN (HCC): Primary | ICD-10-CM

## 2019-04-01 DIAGNOSIS — E55.9 HYPOVITAMINOSIS D: ICD-10-CM

## 2019-04-01 PROCEDURE — 3045F PR MOST RECENT HEMOGLOBIN A1C LEVEL 7.0-9.0%: CPT | Performed by: INTERNAL MEDICINE

## 2019-04-01 PROCEDURE — 1036F TOBACCO NON-USER: CPT | Performed by: INTERNAL MEDICINE

## 2019-04-01 PROCEDURE — 2022F DILAT RTA XM EVC RTNOPTHY: CPT | Performed by: INTERNAL MEDICINE

## 2019-04-01 PROCEDURE — 99214 OFFICE O/P EST MOD 30 MIN: CPT | Performed by: INTERNAL MEDICINE

## 2019-04-01 PROCEDURE — G8417 CALC BMI ABV UP PARAM F/U: HCPCS | Performed by: INTERNAL MEDICINE

## 2019-04-01 PROCEDURE — G8427 DOCREV CUR MEDS BY ELIG CLIN: HCPCS | Performed by: INTERNAL MEDICINE

## 2019-04-01 ASSESSMENT — PATIENT HEALTH QUESTIONNAIRE - PHQ9
SUM OF ALL RESPONSES TO PHQ QUESTIONS 1-9: 1
SUM OF ALL RESPONSES TO PHQ9 QUESTIONS 1 & 2: 1
1. LITTLE INTEREST OR PLEASURE IN DOING THINGS: 1
SUM OF ALL RESPONSES TO PHQ QUESTIONS 1-9: 1
2. FEELING DOWN, DEPRESSED OR HOPELESS: 0
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
## 2019-04-24 ENCOUNTER — HOSPITAL ENCOUNTER (OUTPATIENT)
Age: 49
Setting detail: SPECIMEN
Discharge: HOME | End: 2019-04-24
Payer: COMMERCIAL

## 2019-04-24 LAB
AVERAGE GLUCOSE: NORMAL
HBA1C MFR BLD: 8.6 %
HCT VFR BLD CALC: 43.2 % (ref 36.3–47.1)
HEMOGLOBIN: 13.8 G/DL (ref 11.9–15.1)
MCH RBC QN AUTO: 28 PG (ref 25.2–33.5)
MCHC RBC AUTO-ENTMCNC: 31.9 G/DL (ref 28.4–34.8)
MCV RBC AUTO: 87.8 FL (ref 82.6–102.9)
NRBC AUTOMATED: 0 PER 100 WBC
PDW BLD-RTO: 13.8 % (ref 11.8–14.4)
PLATELET # BLD: 327 K/UL (ref 138–453)
PMV BLD AUTO: 11.7 FL (ref 8.1–13.5)
RBC # BLD: 4.92 M/UL (ref 3.95–5.11)
WBC # BLD: 7.8 K/UL (ref 3.5–11.3)

## 2019-04-25 LAB
-: ABNORMAL
ALBUMIN SERPL-MCNC: 4 G/DL (ref 3.5–5.2)
ALBUMIN/GLOBULIN RATIO: 1.1 (ref 1–2.5)
ALP BLD-CCNC: 82 U/L (ref 35–104)
ALT SERPL-CCNC: 21 U/L (ref 5–33)
AMORPHOUS: ABNORMAL
ANION GAP SERPL CALCULATED.3IONS-SCNC: 16 MMOL/L (ref 9–17)
AST SERPL-CCNC: 21 U/L
BACTERIA: ABNORMAL
BILIRUB SERPL-MCNC: 0.17 MG/DL (ref 0.3–1.2)
BILIRUBIN URINE: NEGATIVE
BUN BLDV-MCNC: 9 MG/DL (ref 6–20)
BUN/CREAT BLD: ABNORMAL (ref 9–20)
CALCIUM SERPL-MCNC: 9.5 MG/DL (ref 8.6–10.4)
CASTS UA: ABNORMAL /LPF (ref 0–8)
CHLORIDE BLD-SCNC: 97 MMOL/L (ref 98–107)
CHOLESTEROL/HDL RATIO: 4.1
CHOLESTEROL: 141 MG/DL
CO2: 24 MMOL/L (ref 20–31)
COLOR: YELLOW
COMMENT UA: ABNORMAL
CREAT SERPL-MCNC: 0.68 MG/DL (ref 0.5–0.9)
CRYSTALS, UA: ABNORMAL /HPF
EPITHELIAL CELLS UA: ABNORMAL /HPF (ref 0–5)
GFR AFRICAN AMERICAN: >60 ML/MIN
GFR NON-AFRICAN AMERICAN: >60 ML/MIN
GFR SERPL CREATININE-BSD FRML MDRD: ABNORMAL ML/MIN/{1.73_M2}
GFR SERPL CREATININE-BSD FRML MDRD: ABNORMAL ML/MIN/{1.73_M2}
GLUCOSE BLD-MCNC: 328 MG/DL (ref 70–99)
GLUCOSE URINE: ABNORMAL
HDLC SERPL-MCNC: 34 MG/DL
KETONES, URINE: NEGATIVE
LDL CHOLESTEROL DIRECT: 76 MG/DL
LDL CHOLESTEROL: ABNORMAL MG/DL (ref 0–130)
LEUKOCYTE ESTERASE, URINE: ABNORMAL
MUCUS: ABNORMAL
NITRITE, URINE: NEGATIVE
OTHER OBSERVATIONS UA: ABNORMAL
PH UA: 5.5 (ref 5–8)
POTASSIUM SERPL-SCNC: 4.3 MMOL/L (ref 3.7–5.3)
PROTEIN UA: NEGATIVE
RBC UA: ABNORMAL /HPF (ref 0–4)
RENAL EPITHELIAL, UA: ABNORMAL /HPF
SODIUM BLD-SCNC: 137 MMOL/L (ref 135–144)
SPECIFIC GRAVITY UA: 1.02 (ref 1–1.03)
TOTAL PROTEIN: 7.7 G/DL (ref 6.4–8.3)
TRICHOMONAS: ABNORMAL
TRIGL SERPL-MCNC: 450 MG/DL
TSH SERPL DL<=0.05 MIU/L-ACNC: 1.71 MIU/L (ref 0.3–5)
TURBIDITY: ABNORMAL
URINE HGB: NEGATIVE
UROBILINOGEN, URINE: NORMAL
VITAMIN D 25-HYDROXY: 27.7 NG/ML (ref 30–100)
VLDLC SERPL CALC-MCNC: ABNORMAL MG/DL (ref 1–30)
WBC UA: ABNORMAL /HPF (ref 0–5)
YEAST: ABNORMAL

## 2019-04-26 ENCOUNTER — HOSPITAL ENCOUNTER (OUTPATIENT)
Age: 49
Setting detail: SPECIMEN
Discharge: HOME | End: 2019-04-26
Payer: COMMERCIAL

## 2019-04-26 LAB
CULTURE: ABNORMAL
Lab: ABNORMAL
SPECIMEN DESCRIPTION: ABNORMAL

## 2019-04-30 LAB — SURGICAL PATHOLOGY REPORT: NORMAL

## 2019-05-02 ENCOUNTER — OFFICE VISIT (OUTPATIENT)
Dept: INTERNAL MEDICINE CLINIC | Age: 49
End: 2019-05-02
Payer: COMMERCIAL

## 2019-05-02 VITALS
DIASTOLIC BLOOD PRESSURE: 64 MMHG | WEIGHT: 264.5 LBS | HEIGHT: 67 IN | BODY MASS INDEX: 41.52 KG/M2 | SYSTOLIC BLOOD PRESSURE: 122 MMHG

## 2019-05-02 DIAGNOSIS — E11.69 TYPE 2 DIABETES MELLITUS WITH OTHER SPECIFIED COMPLICATION, WITH LONG-TERM CURRENT USE OF INSULIN (HCC): ICD-10-CM

## 2019-05-02 DIAGNOSIS — Z79.4 TYPE 2 DIABETES MELLITUS WITH OTHER SPECIFIED COMPLICATION, WITH LONG-TERM CURRENT USE OF INSULIN (HCC): ICD-10-CM

## 2019-05-02 PROCEDURE — G0108 DIAB MANAGE TRN  PER INDIV: HCPCS | Performed by: INTERNAL MEDICINE

## 2019-05-02 NOTE — PROGRESS NOTES
[facility]  [facility]. [address] [place] [place], [address]  [phone] (phone)  [phone] (fax)    Patient ID: Radha Lira 1970  Referring Provider: Dr. Antonina Middleton     Patient's name and  were verified. Subjective:    She presents for Her follow-up diabetic visit. She has type 2 diabetes mellitus. Home regimen includes: insulin and SGLT2. She is noncompliant some of the time. Assessment:     Lab Results   Component Value Date    LABA1C 8.6 2019    LABA1C 7.7 2018    BUN 9 2019    CREATININE 0.68 2019     Vitals:    19 0906   BP: (!) 169/75   Site: Right Upper Arm   Position: Sitting   Weight: 264 lb 8 oz (120 kg)   Height: 5' 7\" (1.702 m)     Wt Readings from Last 3 Encounters:   19 264 lb 8 oz (120 kg)   19 264 lb (119.7 kg)   19 266 lb 6.4 oz (120.8 kg)     Ht Readings from Last 3 Encounters:   19 5' 7\" (1.702 m)   19 5' 7\" (1.702 m)   19 5' 7\" (1.702 m)       Current monitoring regimen: home blood tests - 4-5 times daily  Home blood sugar trends: see insulin pump download  Any episodes of hypoglycemia? no  Previous visit with dietician: yes -   Current diet: B 9am- sausage/egg/ cheese biscuit 1/2 biscuit                       L- salad with chicken breast/ apple sauce                       D- hamburger trinity/cheese/ lettuce/ popcorn                       Snacking -apples/ peanut butter -occas               Diet tea/ diet coke/ crystal light  Current exercise: therapy 3 times per week; 60 mins. therapy exercise other days 30 mins  Eye exam current (within one year): yes 3/4/1019 Dr. Kayla You -glaucoma pressure improved/ stable                                  Last dilation 10/2018  Any history of foot problems? Yes. Slow healing Lt foot  Last foot exam: Dr. Marcus Pichardo up to date: yes - 2018  Taking ASA:  Yes   Appropriate for use of MyChart Glucose Grid:  Yes    Focus: Follow up insulin pump visit.  Blood

## 2019-05-02 NOTE — PATIENT INSTRUCTIONS
1. Set a goal to do Randi Salt Chair aerobics 10 minutes every day --on youtube  2. Set a goal to stop drinking pop --or have a goal to drink 1 on Friday or Saturday  3. Eat dinner by 6pm  4. Keep doing physical therapy exercises. 5. Bolus for anything you are eating  6.  Get light/ diet bread that is only 6-7 grams carb per slice

## 2019-05-16 NOTE — TELEPHONE ENCOUNTER
Mychart message from [address] requesting an increase in Jardiance from 10mg daily to 25mg to help with BS control. Blood sugars continue to run above goal.    Dr. Wade Roth,    Please authorize the increase of jardiance from 10mg daily to 25mg daily for BS control. The script from 2/25/19 did not transmit.

## 2019-06-10 ENCOUNTER — OFFICE VISIT (OUTPATIENT)
Dept: INTERNAL MEDICINE CLINIC | Age: 49
End: 2019-06-10
Payer: COMMERCIAL

## 2019-06-10 ENCOUNTER — TELEPHONE (OUTPATIENT)
Dept: INTERNAL MEDICINE CLINIC | Age: 49
End: 2019-06-10

## 2019-06-10 VITALS
WEIGHT: 257.4 LBS | SYSTOLIC BLOOD PRESSURE: 98 MMHG | BODY MASS INDEX: 40.4 KG/M2 | DIASTOLIC BLOOD PRESSURE: 58 MMHG | HEIGHT: 67 IN

## 2019-06-10 DIAGNOSIS — Z79.4 TYPE 2 DIABETES MELLITUS WITH OTHER SPECIFIED COMPLICATION, WITH LONG-TERM CURRENT USE OF INSULIN (HCC): ICD-10-CM

## 2019-06-10 DIAGNOSIS — E11.69 TYPE 2 DIABETES MELLITUS WITH OTHER SPECIFIED COMPLICATION, WITH LONG-TERM CURRENT USE OF INSULIN (HCC): ICD-10-CM

## 2019-06-10 PROCEDURE — G0108 DIAB MANAGE TRN  PER INDIV: HCPCS | Performed by: INTERNAL MEDICINE

## 2019-06-10 RX ORDER — DOXYCYCLINE HYCLATE 100 MG
100 TABLET ORAL 2 TIMES DAILY
Refills: 0 | COMMUNITY
Start: 2019-05-31 | End: 2019-08-05 | Stop reason: ALTCHOICE

## 2019-06-10 RX ORDER — FLUCONAZOLE 150 MG/1
1 TABLET ORAL PRN
Refills: 0 | COMMUNITY
Start: 2019-04-24 | End: 2020-02-06 | Stop reason: ALTCHOICE

## 2019-06-10 RX ORDER — LORATADINE 10 MG/1
10 TABLET ORAL DAILY
COMMUNITY
End: 2019-08-05 | Stop reason: ALTCHOICE

## 2019-06-10 RX ORDER — POTASSIUM CHLORIDE 750 MG/1
10 TABLET, FILM COATED, EXTENDED RELEASE ORAL DAILY
Refills: 0 | COMMUNITY
Start: 2019-06-03

## 2019-06-10 RX ORDER — MULTIVIT-MIN/IRON/FOLIC ACID/K 18-600-40
500 CAPSULE ORAL DAILY
COMMUNITY

## 2019-06-10 RX ORDER — ERGOCALCIFEROL 1.25 MG/1
50000 CAPSULE ORAL WEEKLY
COMMUNITY
End: 2020-03-05 | Stop reason: DRUGHIGH

## 2019-06-10 RX ORDER — FLUTICASONE PROPIONATE 50 MCG
1 SPRAY, SUSPENSION (ML) NASAL PRN
Refills: 1 | COMMUNITY
Start: 2019-06-04

## 2019-06-10 NOTE — PROGRESS NOTES
[facility]  [address] [facility], [address]  [phone] (phone)  [phone] (fax)    Patient ID: Kade Bonner 1970  Referring Provider: Dr. Malinda Morales     Patient's name and  were verified. Subjective:    She presents for Her follow-up diabetic visit. She has type 2 diabetes mellitus. Home regimen includes: insulin  And SGLT2. She is noncompliant some of the time. Assessment:     Lab Results   Component Value Date    LABA1C 8.6 2019    BUN 9 2019    CREATININE 0.68 2019     Vitals:    06/10/19 1108   Weight: 257 lb 6.4 oz (116.8 kg)   Height: 5' 7\" (1.702 m)     Wt Readings from Last 3 Encounters:   06/10/19 257 lb 6.4 oz (116.8 kg)   19 264 lb 8 oz (120 kg)   19 264 lb (119.7 kg)     Ht Readings from Last 3 Encounters:   06/10/19 5' 7\" (1.702 m)   19 5' 7\" (1.702 m)   19 5' 7\" (1.702 m)       Glucose at 2.5 hrs PPD today resulted at 231mg/dl  Current monitoring regimen: home blood tests - 3-4 times daily  Home blood sugar trends: see insulin pump download. Checking 3-4 times per day  Any episodes of hypoglycemia? No; lowest BS 72 at noon  Previous visit with dietician: yes -   Current diet: B- eggs/ toast/coffee                       L- salad w/ chicken or tuna/ fruit                       D- skipping some day. Meat/ veg                       Snacking -- celery/ pb. Apple. Cantalope,  Current exercise: therapy / bands arms bilat 5-10 minutes 2-3 times per day  Eye exam current (within one year): yes Dr. Darrick Salomon 3/2019  Any history of foot problems? yes  Last foot exam: boot Lt foot last harper 19. Sore 2nd toe. Next appt 19. Immunizations up to date: yes -   Taking ASA:  Yes   Appropriate for use of MyChart Glucose Grid:  Yes    Focus: Follow up insulin pump visit. Yessenia Phillips has lost 7# since last visit and blood sugar obtained are improved.  It is noted, however, that she is skipping meals and not bolusing for some meals. It seems that some of the issue is \"button pushing\" on her pump--not pushing activate button enough times to actually deliver the bolus. Also, she is skipping meals \"not hungry\", but will then snack between meals and not bolus for those snacks. She is down emotionally. Her daughterter is working more and with a new sore on her Lt toe (following with podiatry), she is sitting home alone a lot and feeling discouraged. She is seeing a counselor every 2 months-discussed option to increase frequency to at least monthly. It is important to be emotionally healthy to stay on track. She is commended for the weight loss/ improved BS's but reinforced the need to make further improvements. She is at very high risk for further amputations and we can prevent this. Much encouragement given. Follow up visit 6 weeks. DSME PLAN:   Discussed general issues about diabetes pathophysiology and management. Counseling at today's visit: BG goals; carbs-bolusing for all carbs; exercise; wt loss. 1. Check blood sugars before you eat and bedtime --4 checks per day  2. Eat 3 meals each day. 30-50 grams carb             Bolus for all meals and all snacks that have carbohydrate in them  3. Exercise 20-30 minutes per day 5-10 minutes sessions as needed --watch the clock . Think about aerobics 10 minutes at least once per                 Physical therapy, stretch bands, chair aerobics  4. Follow up as close as needed and recommended with your counselor          --your physical health depends on your mental health  * BP was low in the office today and Dee reports taking Lisinopril 20mg 2 times daily. She is instructed to discuss her BP and BP medications with her doctor. Meter download, medications, PMH and nursing assessment reviewedDee Bob Friday states She is willing to participate in this plan of care and verbalized understanding of all instructions provided. Teach back used to verify comprehension.     Total time involved in direct patient education: 60 minutes.

## 2019-06-10 NOTE — PATIENT INSTRUCTIONS
1. Check blood sugars before you eat and bedtime --4 checks per day  2. Eat 3 meals each day. 30-50 grams carb             Bolus for all meals and all snacks that have carbohydrate in them  3. Exercise 20-30 minutes per day 5-10 minutes sessions as needed --watch the clock . Think about aerobics 10 minutes at least once per                 Physical therapy, stretch bands, chair aerobics  4.  Follow up as close as needed and recommended with your counselor          --your physical health depends on your mental health

## 2019-07-15 DIAGNOSIS — Z79.4 TYPE 2 DIABETES MELLITUS WITH HYPERGLYCEMIA, WITH LONG-TERM CURRENT USE OF INSULIN (HCC): Primary | ICD-10-CM

## 2019-07-15 DIAGNOSIS — E11.65 TYPE 2 DIABETES MELLITUS WITH HYPERGLYCEMIA, WITH LONG-TERM CURRENT USE OF INSULIN (HCC): Primary | ICD-10-CM

## 2019-07-16 ENCOUNTER — TELEPHONE (OUTPATIENT)
Dept: INTERNAL MEDICINE CLINIC | Age: 49
End: 2019-07-16

## 2019-07-19 DIAGNOSIS — Z79.4 TYPE 2 DIABETES MELLITUS WITH HYPERGLYCEMIA, WITH LONG-TERM CURRENT USE OF INSULIN (HCC): Primary | ICD-10-CM

## 2019-07-19 DIAGNOSIS — E11.65 TYPE 2 DIABETES MELLITUS WITH HYPERGLYCEMIA, WITH LONG-TERM CURRENT USE OF INSULIN (HCC): Primary | ICD-10-CM

## 2019-07-25 DIAGNOSIS — Z79.4 TYPE 2 DIABETES MELLITUS WITH HYPERGLYCEMIA, WITH LONG-TERM CURRENT USE OF INSULIN (HCC): Primary | ICD-10-CM

## 2019-07-25 DIAGNOSIS — E11.65 TYPE 2 DIABETES MELLITUS WITH HYPERGLYCEMIA, WITH LONG-TERM CURRENT USE OF INSULIN (HCC): Primary | ICD-10-CM

## 2019-07-30 ENCOUNTER — TELEPHONE (OUTPATIENT)
Dept: INTERNAL MEDICINE CLINIC | Age: 49
End: 2019-07-30

## 2019-07-30 RX ORDER — LANCETS 30 GAUGE
1 EACH MISCELLANEOUS 4 TIMES DAILY
Qty: 400 EACH | Refills: 3 | Status: SHIPPED | OUTPATIENT
Start: 2019-07-30 | End: 2019-08-05

## 2019-07-30 RX ORDER — GLUCOSAMINE HCL/CHONDROITIN SU 500-400 MG
CAPSULE ORAL
Qty: 400 STRIP | Refills: 3 | Status: SHIPPED | OUTPATIENT
Start: 2019-07-30 | End: 2020-10-19 | Stop reason: SDUPTHER

## 2019-07-30 NOTE — TELEPHONE ENCOUNTER
Call back to Dee. Instructed on Ozempic 0.25mg weekly. Discussed side effect of nausea. Also discussed risk of low blood sugars--when to report to HCP. Dee voiced understanding via teach back.

## 2019-08-05 ENCOUNTER — OFFICE VISIT (OUTPATIENT)
Dept: INTERNAL MEDICINE CLINIC | Age: 49
End: 2019-08-05
Payer: COMMERCIAL

## 2019-08-05 VITALS
WEIGHT: 255 LBS | SYSTOLIC BLOOD PRESSURE: 138 MMHG | HEART RATE: 80 BPM | BODY MASS INDEX: 40.02 KG/M2 | HEIGHT: 67 IN | DIASTOLIC BLOOD PRESSURE: 80 MMHG

## 2019-08-05 DIAGNOSIS — E11.65 TYPE 2 DIABETES MELLITUS WITH HYPERGLYCEMIA, WITH LONG-TERM CURRENT USE OF INSULIN (HCC): Primary | ICD-10-CM

## 2019-08-05 DIAGNOSIS — Z79.4 TYPE 2 DIABETES MELLITUS WITH HYPERGLYCEMIA, WITH LONG-TERM CURRENT USE OF INSULIN (HCC): Primary | ICD-10-CM

## 2019-08-05 LAB — HBA1C MFR BLD: 7.7 % (ref 4.3–5.7)

## 2019-08-05 PROCEDURE — G0108 DIAB MANAGE TRN  PER INDIV: HCPCS | Performed by: INTERNAL MEDICINE

## 2019-08-05 PROCEDURE — 83036 HEMOGLOBIN GLYCOSYLATED A1C: CPT | Performed by: INTERNAL MEDICINE

## 2019-08-05 RX ORDER — HYDROCODONE BITARTRATE AND ACETAMINOPHEN 5; 325 MG/1; MG/1
1 TABLET ORAL PRN
Refills: 0 | COMMUNITY
Start: 2019-07-22 | End: 2022-02-07

## 2019-08-05 NOTE — PATIENT INSTRUCTIONS
Consider basal rates 12am 3.5 units, 6am 3.2 units, 9am 3.6 units, 5pm 3.7 units  * Use temporary for lack of activity or increased activity 120%   Or   80%  Start Ozempic 0.25 mg once weekly  Watch closely for low blood sugars --call quickly if you are seeing 3 or more low blood sugars per week  Call Dexcom   Eat 3 meals per day --set alarms on your phone for meals--go prep your meal right away (before                      you forget or get distracted)             *limit snacking between meals -make sure you bolus for snack between meals

## 2019-08-21 DIAGNOSIS — E11.69 TYPE 2 DIABETES MELLITUS WITH OTHER SPECIFIED COMPLICATION, WITH LONG-TERM CURRENT USE OF INSULIN (HCC): ICD-10-CM

## 2019-08-21 DIAGNOSIS — Z79.4 TYPE 2 DIABETES MELLITUS WITH OTHER SPECIFIED COMPLICATION, WITH LONG-TERM CURRENT USE OF INSULIN (HCC): ICD-10-CM

## 2019-08-22 ENCOUNTER — HOSPITAL ENCOUNTER (OUTPATIENT)
Dept: PHYSICAL THERAPY | Age: 49
Setting detail: THERAPIES SERIES
Discharge: HOME | End: 2019-08-22
Payer: COMMERCIAL

## 2019-08-22 PROCEDURE — 97750 PHYSICAL PERFORMANCE TEST: CPT

## 2019-08-22 NOTE — PROGRESS NOTES
[address] [address]  OUTPATIENT PHYSICAL THERAPY  FUNCTIONAL CAPACITY EVALUATION GOALS    REFERRING PHYSICIAN:  Dr. Brandee Pierce  DIAGNOSIS:  G56.10, , R20.2, Z48.811; right wrist and humerus   CLAIM #:  Not applicable   DATE OF ONSET:  10/15/2018    Client in clinic for completion of functional capacity evaluation. Please refer to evaluation in chart for details. Thank you for allowing me to assist in the care of this patient.        SESSION START TIME:   6687    SESSION STOP TIME:  1445    SESSION DURATION:  120 minutes    Alis MAYS#[id number] 8/22/2019 3:12 PM

## 2019-08-28 RX ORDER — LANCETS 30 GAUGE
EACH MISCELLANEOUS
Qty: 600 EACH | Refills: 3 | Status: SHIPPED | OUTPATIENT
Start: 2019-08-28 | End: 2020-10-19 | Stop reason: SDUPTHER

## 2019-10-01 ENCOUNTER — OFFICE VISIT (OUTPATIENT)
Dept: INTERNAL MEDICINE CLINIC | Age: 49
End: 2019-10-01
Payer: COMMERCIAL

## 2019-10-01 VITALS
SYSTOLIC BLOOD PRESSURE: 115 MMHG | BODY MASS INDEX: 41.67 KG/M2 | RESPIRATION RATE: 16 BRPM | DIASTOLIC BLOOD PRESSURE: 62 MMHG | WEIGHT: 259.31 LBS | HEART RATE: 77 BPM | HEIGHT: 66 IN

## 2019-10-01 DIAGNOSIS — Z79.4 TYPE 2 DIABETES MELLITUS WITH HYPERGLYCEMIA, WITH LONG-TERM CURRENT USE OF INSULIN (HCC): Primary | ICD-10-CM

## 2019-10-01 DIAGNOSIS — E66.01 MORBID OBESITY WITH BMI OF 40.0-44.9, ADULT (HCC): ICD-10-CM

## 2019-10-01 DIAGNOSIS — Z96.41 INSULIN PUMP IN PLACE: ICD-10-CM

## 2019-10-01 DIAGNOSIS — E11.65 TYPE 2 DIABETES MELLITUS WITH HYPERGLYCEMIA, WITH LONG-TERM CURRENT USE OF INSULIN (HCC): Primary | ICD-10-CM

## 2019-10-01 DIAGNOSIS — E78.2 MIXED HYPERLIPIDEMIA: ICD-10-CM

## 2019-10-01 DIAGNOSIS — I10 ESSENTIAL HYPERTENSION: ICD-10-CM

## 2019-10-01 PROCEDURE — 3045F PR MOST RECENT HEMOGLOBIN A1C LEVEL 7.0-9.0%: CPT | Performed by: INTERNAL MEDICINE

## 2019-10-01 PROCEDURE — 2022F DILAT RTA XM EVC RTNOPTHY: CPT | Performed by: INTERNAL MEDICINE

## 2019-10-01 PROCEDURE — G8427 DOCREV CUR MEDS BY ELIG CLIN: HCPCS | Performed by: INTERNAL MEDICINE

## 2019-10-01 PROCEDURE — G8417 CALC BMI ABV UP PARAM F/U: HCPCS | Performed by: INTERNAL MEDICINE

## 2019-10-01 PROCEDURE — 99214 OFFICE O/P EST MOD 30 MIN: CPT | Performed by: INTERNAL MEDICINE

## 2019-10-01 PROCEDURE — G8484 FLU IMMUNIZE NO ADMIN: HCPCS | Performed by: INTERNAL MEDICINE

## 2019-10-01 PROCEDURE — 1036F TOBACCO NON-USER: CPT | Performed by: INTERNAL MEDICINE

## 2019-10-10 DIAGNOSIS — E11.65 TYPE 2 DIABETES MELLITUS WITH HYPERGLYCEMIA, WITH LONG-TERM CURRENT USE OF INSULIN (HCC): Primary | ICD-10-CM

## 2019-10-10 DIAGNOSIS — Z79.4 TYPE 2 DIABETES MELLITUS WITH HYPERGLYCEMIA, WITH LONG-TERM CURRENT USE OF INSULIN (HCC): Primary | ICD-10-CM

## 2019-11-05 ENCOUNTER — TELEPHONE (OUTPATIENT)
Dept: INTERNAL MEDICINE CLINIC | Age: 49
End: 2019-11-05

## 2019-11-05 ENCOUNTER — OFFICE VISIT (OUTPATIENT)
Dept: INTERNAL MEDICINE CLINIC | Age: 49
End: 2019-11-05
Payer: MEDICAID

## 2019-11-05 VITALS
HEIGHT: 67 IN | WEIGHT: 265.2 LBS | BODY MASS INDEX: 41.62 KG/M2 | SYSTOLIC BLOOD PRESSURE: 112 MMHG | DIASTOLIC BLOOD PRESSURE: 66 MMHG

## 2019-11-05 DIAGNOSIS — Z79.4 TYPE 2 DIABETES MELLITUS WITH HYPERGLYCEMIA, WITH LONG-TERM CURRENT USE OF INSULIN (HCC): Primary | ICD-10-CM

## 2019-11-05 DIAGNOSIS — E11.65 TYPE 2 DIABETES MELLITUS WITH HYPERGLYCEMIA, WITH LONG-TERM CURRENT USE OF INSULIN (HCC): Primary | ICD-10-CM

## 2019-11-05 LAB — HBA1C MFR BLD: 6.3 % (ref 4.3–5.7)

## 2019-11-05 PROCEDURE — G0108 DIAB MANAGE TRN  PER INDIV: HCPCS | Performed by: INTERNAL MEDICINE

## 2019-11-05 PROCEDURE — 83036 HEMOGLOBIN GLYCOSYLATED A1C: CPT | Performed by: INTERNAL MEDICINE

## 2019-11-06 DIAGNOSIS — E11.65 TYPE 2 DIABETES MELLITUS WITH HYPERGLYCEMIA, WITH LONG-TERM CURRENT USE OF INSULIN (HCC): ICD-10-CM

## 2019-11-06 DIAGNOSIS — Z79.4 TYPE 2 DIABETES MELLITUS WITH HYPERGLYCEMIA, WITH LONG-TERM CURRENT USE OF INSULIN (HCC): ICD-10-CM

## 2019-11-18 ENCOUNTER — TELEPHONE (OUTPATIENT)
Dept: INTERNAL MEDICINE CLINIC | Age: 49
End: 2019-11-18

## 2019-11-18 DIAGNOSIS — Z79.4 TYPE 2 DIABETES MELLITUS WITH HYPERGLYCEMIA, WITH LONG-TERM CURRENT USE OF INSULIN (HCC): Primary | ICD-10-CM

## 2019-11-18 DIAGNOSIS — E11.65 TYPE 2 DIABETES MELLITUS WITH HYPERGLYCEMIA, WITH LONG-TERM CURRENT USE OF INSULIN (HCC): Primary | ICD-10-CM

## 2019-12-18 LAB — GLUCOSE, FASTING: 193 MG/DL (ref 70–110)

## 2019-12-20 LAB — C-PEPTIDE: 3.8 NG/ML (ref 1.1–4.4)

## 2020-01-23 ENCOUNTER — HOSPITAL ENCOUNTER (OUTPATIENT)
Age: 50
Setting detail: SPECIMEN
Discharge: HOME | End: 2020-01-23
Payer: MEDICAID

## 2020-01-23 LAB
AVERAGE GLUCOSE: NORMAL
HBA1C MFR BLD: 6.6 %

## 2020-01-26 LAB
CULTURE: ABNORMAL
DIRECT EXAM: ABNORMAL
Lab: ABNORMAL
SPECIMEN DESCRIPTION: ABNORMAL

## 2020-02-06 ENCOUNTER — OFFICE VISIT (OUTPATIENT)
Dept: INTERNAL MEDICINE CLINIC | Age: 50
End: 2020-02-06

## 2020-02-06 VITALS — HEIGHT: 67 IN | WEIGHT: 262 LBS | BODY MASS INDEX: 41.12 KG/M2

## 2020-02-06 PROCEDURE — 97802 MEDICAL NUTRITION INDIV IN: CPT | Performed by: DIETITIAN, REGISTERED

## 2020-02-06 NOTE — PROGRESS NOTES
[address] [place] [address]. [address] [place] [address] Valeriano., [facility], [facility] [address]  [phone] (phone)  [phone] (fax)    Patient Name: More Davis. Date of Birth: [id number]. MRN: [id number]      Assessment: Patient is a 47 y.o. female seen for Initial MNT visit for Type 2 DB.     -Nutritionally relevant labs:   Lab Results   Component Value Date/Time    LABA1C 6.3 (H) 11/05/2019 08:52 AM    LABA1C 7.7 (H) 08/05/2019 08:45 AM    LABA1C 8.6 04/24/2019    LABA1C 8.6 (H) 01/17/2019 08:53 AM    LABA1C 7.7 (H) 02/22/2018 02:20 PM    LABA1C 7.0 (H) 11/28/2017 09:33 AM    GLUCOSE 328 (H) 04/24/2019 06:28 PM    GLUCOSE 138 (H) 01/24/2019 11:51 PM    GLUCOSE 339 (H) 09/25/2018 11:05 AM    GLUCOSE 201 (H) 05/12/2015 08:38 AM    CHOL 141 04/24/2019 06:28 PM    CHOL 142 11/28/2017 09:33 AM    CHOL 150 11/28/2017 09:33 AM    HDL 34 (L) 04/24/2019 06:28 PM    LDLCALC 69 11/28/2017 09:33 AM    TRIG 450 (H) 04/24/2019 06:28 PM     Pt states her Hgb AIC was 6.7% 2 weeks ago at her family PCP. -Blood sugar trends: Downloaded pump and uploaded/printed report and plan to scan. Per Pump download report, Carb intake/day range:  85 - 145 gms/day  FBS:  164 - 239  Mid afternoon readings:  230 - 349  Dinner:  170 - 243, one highest >400  Bedtime:  151 - 280    Pt states she tries to stay under 45 gms carbs/meal with 55 gms as the maximum. Pt not working anymore since April 2018. She had OHS and infection in chest, broke arm and another toe taking off in  2018  Then 2 toes of last year 2019  4th toe off this January 2020. Household - lives with daughter 32 yr EMT. -Food recall:   Breakfast: (~ 8 am) 1-2 eggs, 1 sl toast, coffee occ yogurt and not the toast.    occ snack - celery and pbutter OR crackers (saltines) and applesauce. Avoids townhouse crackers because she can easily overeat with these.   Lunch: 1230 - 1 pm - garden salad made at home - grilled chicken breast and mixed up with spray by Each Nostril route as needed  1    LYRICA 150 MG capsule Take 150 mg by mouth 2 times daily. 2    venlafaxine (EFFEXOR XR) 75 MG extended release capsule Take 225 mg by mouth daily  1    cetirizine (ZYRTEC) 10 MG tablet Take 10 mg by mouth daily      Blood Glucose Monitoring Suppl (ONE TOUCH ULTRA 2) w/Device KIT Check blood sugar 6 x daily (Dx: E11.69) 1 kit 0    furosemide (LASIX) 20 MG tablet Take 20 mg by mouth 2 times daily      lisinopril (PRINIVIL;ZESTRIL) 20 MG tablet Take 10 mg by mouth daily Per heart doctor      metoprolol succinate (TOPROL XL) 25 MG extended release tablet Take 25 mg by mouth daily      isosorbide mononitrate (IMDUR) 30 MG extended release tablet Take 30 mg by mouth daily      nitroGLYCERIN (NITROSTAT) 0.4 MG SL tablet Place 0.4 mg under the tongue every 5 minutes as needed for Chest pain up to max of 3 total doses. If no relief after 1 dose, call 911.  brimonidine-timolol (COMBIGAN) 0.2-0.5 % ophthalmic solution Place 1 drop into both eyes every 12 hours      latanoprost (XALATAN) 0.005 % ophthalmic solution Place 1 drop into both eyes daily  0    acetaminophen (TYLENOL) 325 MG tablet Take 650 mg by mouth every 6 hours as needed for Pain      atorvastatin (LIPITOR) 20 MG tablet Take 40 mg by mouth daily       traZODone (DESYREL) 50 MG tablet Take 50 mg by mouth nightly.  aspirin 81 MG tablet Take 81 mg by mouth daily.  glucagon 1 MG injection Inject 1 mg into the muscle See Admin Instructions Follow package directions for low blood sugar. 1 kit 3    vitamin D (ERGOCALCIFEROL) 53283 units CAPS capsule Take 50,000 Units by mouth once a week       No current facility-administered medications on file prior to visit. Vitals from current and previous visits:  Ht 5' 7\" (1.702 m)   Wt 262 lb (118.8 kg)   BMI 41.04 kg/m²     -Body mass index is 41.04 kg/m². Greater than 40 - Morbid Obesity / Extreme Obesity / Grade III.      -Weight goal: lose Indicates understanding.  -Readiness to change: precontemplative- eating non-starchy veggies with meals and snacks, chair exercises, completing food logging.  -Expected compliance: fair. Thank you for your referral of this patient. Total time involved in direct patient education: 45 minutes for Initial MNT visit.

## 2020-03-05 ENCOUNTER — TELEPHONE (OUTPATIENT)
Dept: INTERNAL MEDICINE CLINIC | Age: 50
End: 2020-03-05

## 2020-03-05 ENCOUNTER — OFFICE VISIT (OUTPATIENT)
Dept: INTERNAL MEDICINE CLINIC | Age: 50
End: 2020-03-05

## 2020-03-05 VITALS
DIASTOLIC BLOOD PRESSURE: 82 MMHG | SYSTOLIC BLOOD PRESSURE: 132 MMHG | BODY MASS INDEX: 41.53 KG/M2 | WEIGHT: 264.6 LBS | HEIGHT: 67 IN

## 2020-03-05 PROCEDURE — G0108 DIAB MANAGE TRN  PER INDIV: HCPCS | Performed by: INTERNAL MEDICINE

## 2020-03-05 RX ORDER — TRAMADOL HYDROCHLORIDE 50 MG/1
50 TABLET ORAL PRN
COMMUNITY
Start: 2019-12-31 | End: 2020-11-10 | Stop reason: ALTCHOICE

## 2020-03-05 RX ORDER — METHOCARBAMOL 750 MG/1
1 TABLET ORAL WEEKLY
COMMUNITY
Start: 2020-02-26 | End: 2021-05-12 | Stop reason: ALTCHOICE

## 2020-03-05 RX ORDER — CLOPIDOGREL BISULFATE 75 MG/1
75 TABLET ORAL DAILY
COMMUNITY

## 2020-03-05 RX ORDER — CHOLECALCIFEROL (VITAMIN D3) 1250 MCG
1 CAPSULE ORAL WEEKLY
COMMUNITY
Start: 2020-02-24 | End: 2020-03-05 | Stop reason: DRUGHIGH

## 2020-03-05 RX ORDER — LISINOPRIL 10 MG/1
10 TABLET ORAL DAILY
COMMUNITY
Start: 2020-02-26

## 2020-03-05 NOTE — PATIENT INSTRUCTIONS
1. Basal rate 5pm 3.75 units per hr  2. Carb ratio 4pm 1.3 grams  3. Sensitivity 10am 14 mg, 4pm 12mg  4. Keep using [employer] exercise video even when you are walking again              Ideal --every evening for 10 minutes  5. Keep giving bolus for all meals and snacks --keep putting your glucose results in your insulin pump  We will inquire about Tandem insulin pump.

## 2020-03-05 NOTE — PROGRESS NOTES
[facility]  [address] [address] [place], [address]  [phone] (phone)  [phone] (fax)    Patient ID: Anuradha Sylvester 1970  Referring Provider: Dr. Rosa Vega     Patient's name and  were verified. Subjective:    She presents for Her follow-up diabetic visit. She has type 2 diabetes mellitus. Home regimen includes: insulin  GLP-1 agonist and SGLT2. She is compliant most of the time. Assessment:     Lab Results   Component Value Date    LABA1C 6.6 2020    BUN 9 2019    CREATININE 0.68 2019     There were no vitals filed for this visit. Wt Readings from Last 3 Encounters:   20 262 lb (118.8 kg)   19 265 lb 3.2 oz (120.3 kg)   10/01/19 259 lb 5 oz (117.6 kg)     Ht Readings from Last 3 Encounters:   20 5' 7\" (1.702 m)   19 5' 7\" (1.702 m)   10/01/19 5' 6\" (1.676 m)       Glucose at 3 hrs PPD today resulted at 176mg/dl  Current monitoring regimen: home blood tests - 4+ times daily/ CGM  Home blood sugar trends: see pump/ CGM download  Any episodes of hypoglycemia? yes - less than once per week  Previous visit with dietician: yes - 2020  Current diet: B- coffee/ banana        L- cheese burger/ baked chips        D- chicken breast/ salad         Snack between meals- celery/ apple  Current exercise: ADL's- low to moderate activity. Use Retevo chair exercise occas  Eye exam current (within one year): yes Dr. Lena Martínez 20 --glaucoma/ may need cataracts  Any history of foot problems? Yes       Right foot-1/2 of second toe; third toe       Left foot- great toe, second toe  Last foot exam: Dr. Carol Jeffrey 20-f/u to amputation 3rd toe Right foot  Immunizations up to date: yes -   Taking ASA:  Yes   Appropriate for use of MyChart Glucose Grid:  Yes    Focus:     Diabetes follow up. Recent A1C 6.6%. Blood sugars trend down through the night and are in reasonable control during the day.  Glucose levels are rising with dinner and through the evening. Discussed changes in her pump settings to address these issues. Weight is steady. Dietary report suggests limited calorie intake. Goal: move more and make sure calorie intake is limited. Dee is working with the dietician for meal planning. She just had another toe amputated 1/2020. She is motivated to keep BS's under better control. We are still trying to get the Tandem insulin pump approved for Dee. Follow up visit 2 months with HEIDI.    PHIL PLAN:   Discussed general issues about diabetes pathophysiology and management. Counseling at today's visit: weight loss; exercise; carbs; BG goals; basal vs bolus. 1. Basal rate 5pm 3.75 units per hr  2. Carb ratio 4pm 1.3 grams  3. Sensitivity 10am 14 mg, 4pm 12mg  4. Keep using [employer] chair exercise video even when you are walking again              Ideal --every evening for 10 minutes  5. Keep giving bolus for all meals and snacks --keep putting your glucose results in your insulin pump  We will inquire about Tandem insulin pump. Meter download, medications, PMH and nursing assessment reviewed. Gray Garcia states She is willing to participate in this plan of care and verbalized understanding of all instructions provided. Teach back used to verify comprehension. Total time involved in direct patient education: 60 minutes.

## 2020-07-06 ENCOUNTER — OFFICE VISIT (OUTPATIENT)
Dept: INTERNAL MEDICINE CLINIC | Age: 50
End: 2020-07-06
Payer: MEDICAID

## 2020-07-06 VITALS
WEIGHT: 265.4 LBS | TEMPERATURE: 97.5 F | HEART RATE: 75 BPM | SYSTOLIC BLOOD PRESSURE: 139 MMHG | DIASTOLIC BLOOD PRESSURE: 61 MMHG | BODY MASS INDEX: 41.65 KG/M2 | HEIGHT: 67 IN

## 2020-07-06 LAB — HBA1C MFR BLD: 7.7 % (ref 4.3–5.7)

## 2020-07-06 PROCEDURE — G0108 DIAB MANAGE TRN  PER INDIV: HCPCS | Performed by: INTERNAL MEDICINE

## 2020-07-06 PROCEDURE — 83036 HEMOGLOBIN GLYCOSYLATED A1C: CPT | Performed by: INTERNAL MEDICINE

## 2020-07-06 NOTE — PROGRESS NOTES
[facility]  [address] [address] [address]., [facility], [address]  [phone] (phone)  [phone] (fax)    Patient ID: Jai Swanson 1970  Referring Provider: Dr. Lina Marie     Patient's name and  were verified. Subjective:    She presents for Her follow-up diabetic visit. She has type 2 diabetes mellitus. Home regimen includes: insulin  GLP-1 agonist and SGLT2. She is noncompliant some of the time. Assessment:     Lab Results   Component Value Date    LABA1C 6.6 2020    BUN 9 2019    CREATININE 0.68 2019     There were no vitals filed for this visit. Wt Readings from Last 3 Encounters:   20 264 lb 9.6 oz (120 kg)   20 262 lb (118.8 kg)   19 265 lb 3.2 oz (120.3 kg)     Ht Readings from Last 3 Encounters:   20 5' 7\" (1.702 m)   20 5' 7\" (1.702 m)   19 5' 7\" (1.702 m)       Glucose at 2 hrs PPD today resulted at 179mg/dl  Current monitoring regimen: home blood tests - 4 times daily; CGM Dexcom  Home blood sugar trends:   Any episodes of hypoglycemia? yes - 1-2 times per week; random timing  Previous visit with dietician: yes -   Current diet: B 2 eggs           L ham sandwich w/ tomato           D salad/ chicken breast           Snacking carrots/ celery/ watermelon             BT crackers and PB  Current exercise: walking 5-10 minutes every day; 2 times per day  Eye exam current (within one year): yes 20 Dr. Omar Zuleta. Appt   -cataract surgery left and shunt placement for glaucoma;  right eyeAny history of foot problems? Last foot exam: Dr. Antonio Regan 2020  Immunizations up to date: yes -   Taking ASA:  Yes   Appropriate for use of MyChart Glucose Grid:  Yes    Focus:     Diabetes education. A1C today 7.7% today. Weight is up 3# from last visit and A1C went up. When discussing food diary, Dee reports good carb/ calorie choices. She then explains the \"bad\" choices later in the day and between meals.  She has just started exercise efforts; encouraged consistent efforts. Much encouragement given. Weight loss is very important. Discussed ideas to facilitate efforts. Follow up 3 months and as needed. DSME PLAN:   Discussed general issues about diabetes pathophysiology and management. Counseling at today's visit: BG goals; exercise; consistent meal plan efforts; basal vs bolus. Keep your meal plan on track--get your daughter involved      3 meals per day --do not need to be big meals      Limit snacks --try to do veggies or fruit only snack in the evening  Exercise every day --walking the dog or Body Groove or [employer]              Goal: total of 20-30 minutes daily  Carb ratio 10am 2.0 grams and 4pm 1.0 grams  Call [facility] if you start having low blood sugars. Bolus for all food eaten  Goal: not to lose any more toes!! It is worth making dietary efforts! Meter download, medications, PMH and nursing assessment reviewed. Tiara Strong states She is willing to participate in this plan of care and verbalized understanding of all instructions provided. Teach back used to verify comprehension. Total time involved in direct patient education: 60 minutes.

## 2020-07-06 NOTE — PATIENT INSTRUCTIONS
Keep your meal plan on track--get your daughter involved      3 meals per day --do not need to be big meals      Limit snacks --try to do veggies or fruit only snack in the evening  Exercise every day --walking the dog or Body Groove or Alvin Jointer              Goal: total of 20-30 minutes daily  Carb ratio 10am 2.0 grams and 4pm 1.0 grams  Call [employer] [facility] if you start having low blood sugars. Bolus for all food eaten  Goal: not to lose any more toes!! It is worth making dietary efforts!

## 2020-07-07 RX ORDER — EMPAGLIFLOZIN 25 MG/1
TABLET, FILM COATED ORAL
Qty: 90 TABLET | Refills: 1 | Status: SHIPPED | OUTPATIENT
Start: 2020-07-07 | End: 2020-07-08 | Stop reason: SDUPTHER

## 2020-07-07 RX ORDER — SEMAGLUTIDE 1.34 MG/ML
INJECTION, SOLUTION SUBCUTANEOUS
Qty: 1.5 ML | Refills: 3 | Status: SHIPPED | OUTPATIENT
Start: 2020-07-07 | End: 2020-07-08 | Stop reason: SDUPTHER

## 2020-07-08 NOTE — TELEPHONE ENCOUNTER
Diabetes education. Dee is requesting refills of her Ozempic, Novolog and Jardiance. 90 day supply to [facility].

## 2020-07-09 RX ORDER — SEMAGLUTIDE 1.34 MG/ML
0.5 INJECTION, SOLUTION SUBCUTANEOUS WEEKLY
Qty: 1.5 ML | Refills: 3 | Status: SHIPPED | OUTPATIENT
Start: 2020-07-09 | End: 2020-10-19 | Stop reason: SDUPTHER

## 2020-07-09 RX ORDER — EMPAGLIFLOZIN 25 MG/1
25 TABLET, FILM COATED ORAL DAILY
Qty: 90 TABLET | Refills: 3 | Status: SHIPPED | OUTPATIENT
Start: 2020-07-09 | End: 2020-10-19 | Stop reason: SDUPTHER

## 2020-08-06 ENCOUNTER — OFFICE VISIT (OUTPATIENT)
Dept: INTERNAL MEDICINE CLINIC | Age: 50
End: 2020-08-06
Payer: MEDICAID

## 2020-08-06 ENCOUNTER — TELEPHONE (OUTPATIENT)
Dept: INTERNAL MEDICINE CLINIC | Age: 50
End: 2020-08-06

## 2020-08-06 VITALS
BODY MASS INDEX: 40.56 KG/M2 | HEART RATE: 86 BPM | DIASTOLIC BLOOD PRESSURE: 60 MMHG | HEIGHT: 67 IN | TEMPERATURE: 97.4 F | SYSTOLIC BLOOD PRESSURE: 116 MMHG | WEIGHT: 258.4 LBS

## 2020-08-06 PROCEDURE — G0108 DIAB MANAGE TRN  PER INDIV: HCPCS | Performed by: INTERNAL MEDICINE

## 2020-08-06 RX ORDER — CALCIUM CITRATE/VITAMIN D3 200MG-6.25
1 TABLET ORAL
Qty: 300 EACH | Refills: 5 | Status: SHIPPED | OUTPATIENT
Start: 2020-08-06 | End: 2020-10-19 | Stop reason: SDUPTHER

## 2020-08-06 NOTE — TELEPHONE ENCOUNTER
I called the Tandem company per patient's request and spoke with Lindsey. She stated that she needed to speak with the patient first to get some info and then she will be faxing paperwork that will be needed to be signed so the patient can receive the Tandem pump.

## 2020-08-06 NOTE — PROGRESS NOTES
up.    DSME PLAN:   Discussed general issues about diabetes pathophysiology and management. Counseling at today's visit: pump settings; basal vs bolus; exercise; meal plan. Get back on pump. 90 day loaner from [employer]  20  Download your Dexcom onto  Your Ipad to communicate with the Diabetes               Clinic and with Tandem  Keep exercising every day-- walking with dog and chair exercise in the                Evening with small weights or stretch bands           Great job with weight loss--keep it up!!  Call with any questions or concerns    Meter download, medications, PMH and nursing assessment reviewed. Kathy Osuna states She is willing to participate in this plan of care and verbalized understanding of all instructions provided. Teach back used to verify comprehension. Total time involved in direct patient education: 60 minutes.

## 2020-08-06 NOTE — PATIENT INSTRUCTIONS
Get back on pump.           90 day loaner from [employer]  20  Download your Dexcom onto  Your Ipad to communicate with the Diabetes               Clinic and with Tandem  Keep exercising every day-- walking with dog and chair exercise in the                Evening with small weights or stretch bands           Great job with weight loss--keep it up!!  Call with any questions or concerns

## 2020-08-17 ENCOUNTER — TELEPHONE (OUTPATIENT)
Dept: INTERNAL MEDICINE CLINIC | Age: 50
End: 2020-08-17

## 2020-08-17 NOTE — TELEPHONE ENCOUNTER
Called patient to see if she has heard from [employer] [address]. I had to leave a message but asked for a return call. She has a Medtronic pump for approximately for 1 month and we do not want her to be without a pump if [employer] has not contacted her.

## 2020-10-19 ENCOUNTER — OFFICE VISIT (OUTPATIENT)
Dept: INTERNAL MEDICINE CLINIC | Age: 50
End: 2020-10-19
Payer: MEDICAID

## 2020-10-19 VITALS
BODY MASS INDEX: 42.11 KG/M2 | HEART RATE: 84 BPM | TEMPERATURE: 97.5 F | WEIGHT: 262 LBS | DIASTOLIC BLOOD PRESSURE: 63 MMHG | HEIGHT: 66 IN | SYSTOLIC BLOOD PRESSURE: 138 MMHG | RESPIRATION RATE: 17 BRPM

## 2020-10-19 LAB — HBA1C MFR BLD: 7.4 % (ref 4.3–5.7)

## 2020-10-19 PROCEDURE — G8427 DOCREV CUR MEDS BY ELIG CLIN: HCPCS | Performed by: INTERNAL MEDICINE

## 2020-10-19 PROCEDURE — 1036F TOBACCO NON-USER: CPT | Performed by: INTERNAL MEDICINE

## 2020-10-19 PROCEDURE — 99214 OFFICE O/P EST MOD 30 MIN: CPT | Performed by: INTERNAL MEDICINE

## 2020-10-19 PROCEDURE — 3017F COLORECTAL CA SCREEN DOC REV: CPT | Performed by: INTERNAL MEDICINE

## 2020-10-19 PROCEDURE — 3051F HG A1C>EQUAL 7.0%<8.0%: CPT | Performed by: INTERNAL MEDICINE

## 2020-10-19 PROCEDURE — 2022F DILAT RTA XM EVC RTNOPTHY: CPT | Performed by: INTERNAL MEDICINE

## 2020-10-19 PROCEDURE — G8417 CALC BMI ABV UP PARAM F/U: HCPCS | Performed by: INTERNAL MEDICINE

## 2020-10-19 PROCEDURE — 83036 HEMOGLOBIN GLYCOSYLATED A1C: CPT | Performed by: INTERNAL MEDICINE

## 2020-10-19 PROCEDURE — G8484 FLU IMMUNIZE NO ADMIN: HCPCS | Performed by: INTERNAL MEDICINE

## 2020-10-19 RX ORDER — CALCIUM CITRATE/VITAMIN D3 200MG-6.25
1 TABLET ORAL
Qty: 300 EACH | Refills: 5 | Status: SHIPPED | OUTPATIENT
Start: 2020-10-19 | End: 2021-06-01 | Stop reason: SDUPTHER

## 2020-10-19 RX ORDER — SEMAGLUTIDE 1.34 MG/ML
0.5 INJECTION, SOLUTION SUBCUTANEOUS WEEKLY
Qty: 1.5 ML | Refills: 3 | Status: SHIPPED | OUTPATIENT
Start: 2020-10-19 | End: 2021-05-12 | Stop reason: DRUGHIGH

## 2020-10-19 RX ORDER — LANCETS 30 GAUGE
EACH MISCELLANEOUS
Qty: 600 EACH | Refills: 3 | Status: SHIPPED | OUTPATIENT
Start: 2020-10-19 | End: 2021-11-04 | Stop reason: ALTCHOICE

## 2020-10-19 RX ORDER — EMPAGLIFLOZIN 25 MG/1
25 TABLET, FILM COATED ORAL DAILY
Qty: 90 TABLET | Refills: 3 | Status: SHIPPED | OUTPATIENT
Start: 2020-10-19 | End: 2021-06-30 | Stop reason: SDUPTHER

## 2020-10-19 RX ORDER — GLUCOSAMINE HCL/CHONDROITIN SU 500-400 MG
CAPSULE ORAL
Qty: 400 STRIP | Refills: 3 | Status: SHIPPED | OUTPATIENT
Start: 2020-10-19 | End: 2021-09-30 | Stop reason: ALTCHOICE

## 2020-10-19 NOTE — PROGRESS NOTES
that insurance will cover. 1 kit 0    Handicap Placard MISC HANDICAP PLACARD [facility]ACARD [facility]/08/2018 Provider: 05- - 22-  [facility] 87 ([id number])      clopidogrel (PLAVIX) 75 MG tablet Take 75 mg by mouth daily      D3-50 1.25 MG (12769 UT) CAPS Take 1 capsule by mouth once a week      lisinopril (PRINIVIL;ZESTRIL) 10 MG tablet Take 10 mg by mouth daily      traMADol (ULTRAM) 50 MG tablet Take 50 mg by mouth as needed.  HYDROcodone-acetaminophen (NORCO) 5-325 MG per tablet Take 1 tablet by mouth as needed. 0    potassium chloride (KLOR-CON) 10 MEQ extended release tablet Take 10 mEq by mouth daily  0    Ascorbic Acid (VITAMIN C) 500 MG CAPS Take 500 mg by mouth daily      fluticasone (FLONASE) 50 MCG/ACT nasal spray 1 spray by Each Nostril route as needed  1    LYRICA 150 MG capsule Take 150 mg by mouth 2 times daily. 2    venlafaxine (EFFEXOR XR) 75 MG extended release capsule Take 225 mg by mouth daily  1    cetirizine (ZYRTEC) 10 MG tablet Take 10 mg by mouth daily      furosemide (LASIX) 20 MG tablet Take 20 mg by mouth 2 times daily      metoprolol succinate (TOPROL XL) 25 MG extended release tablet Take 25 mg by mouth daily      isosorbide mononitrate (IMDUR) 30 MG extended release tablet Take 30 mg by mouth daily      nitroGLYCERIN (NITROSTAT) 0.4 MG SL tablet Place 0.4 mg under the tongue every 5 minutes as needed for Chest pain up to max of 3 total doses. If no relief after 1 dose, call 911.  brimonidine-timolol (COMBIGAN) 0.2-0.5 % ophthalmic solution Place 1 drop into both eyes every 12 hours      latanoprost (XALATAN) 0.005 % ophthalmic solution Place 1 drop into both eyes daily  0    acetaminophen (TYLENOL) 325 MG tablet Take 650 mg by mouth every 6 hours as needed for Pain      atorvastatin (LIPITOR) 20 MG tablet Take 40 mg by mouth daily       traZODone (DESYREL) 50 MG tablet Take 50 mg by mouth nightly.      

## 2020-11-03 NOTE — TELEPHONE ENCOUNTER
Last visit- 10/19/2020  Next visit- 11/10/2020    Requested Prescriptions     Pending Prescriptions Disp Refills    Blood Glucose Monitoring Suppl (TRUE METRIX METER) w/Device KIT [Pharmacy Med Name: TRUE METRIX GLUCOSE METER EACH]  10     Sig: USE AS DIRECTED FOUR TIMES A DAY AND AS NEEDED FOR SYMPTOMS OF IRREGULAR BLOOD GLUCOSE

## 2020-11-10 ENCOUNTER — OFFICE VISIT (OUTPATIENT)
Dept: INTERNAL MEDICINE CLINIC | Age: 50
End: 2020-11-10
Payer: MEDICAID

## 2020-11-10 VITALS
DIASTOLIC BLOOD PRESSURE: 64 MMHG | WEIGHT: 259.6 LBS | BODY MASS INDEX: 40.74 KG/M2 | HEIGHT: 67 IN | SYSTOLIC BLOOD PRESSURE: 138 MMHG | HEART RATE: 64 BPM | TEMPERATURE: 97.3 F

## 2020-11-10 PROCEDURE — G0108 DIAB MANAGE TRN  PER INDIV: HCPCS | Performed by: INTERNAL MEDICINE

## 2020-11-10 RX ORDER — ATORVASTATIN CALCIUM 40 MG/1
40 TABLET, FILM COATED ORAL NIGHTLY
COMMUNITY
Start: 2020-10-20

## 2020-11-10 NOTE — TELEPHONE ENCOUNTER
Diabetes education. Medtronic pump download. FBS's and noon readings are above goal. Dinner readings are variable, but more often elevated when lunch is eaten. Dr. Mason Guajardo,      Please authorize the [facility] Clinic at [address] [place] [address] to teach/ assist Dee to change her basal rates       12am 3.35 units  --increase 3.4 units       6am 3.2 units   --increase 3.3 units     Carb ratio  10am 2.0 grams  --increase 1.6 grams. medtronic pump download scanned to [facility]  If you agree, please note and return. Thank you. Also,     Please authorize the [facility] Clinic at [address] [place] [address] to teach/ assist Dee to increase her ozempic from 0.5mg weekly to 1mg weekly. If you agree, please note and return. Thank you.

## 2020-11-10 NOTE — PROGRESS NOTES
[facility]  [address] [address] [address] [place], [address]  [phone] (phone)  [phone] (fax)    Patient ID: Fariba Villa 1970  Referring Provider: Dr. Amberly Hernández     Patient's name and  were verified. Subjective:    She presents for Her follow-up diabetic visit. She has type 2 diabetes mellitus. Home regimen includes: insulin  GLP-1 agonist and SGLT2. She is noncompliant some of the time. Assessment:     Lab Results   Component Value Date    LABA1C 7.4 10/19/2020    LABA1C 6.6 2020    BUN 9 2019    CREATININE 0.68 2019     There were no vitals filed for this visit. Wt Readings from Last 3 Encounters:   10/19/20 262 lb (118.8 kg)   20 258 lb 6.4 oz (117.2 kg)   20 265 lb 6.4 oz (120.4 kg)     Ht Readings from Last 3 Encounters:   10/19/20 5' 6\" (1.676 m)   20 5' 7\" (1.702 m)   20 5' 7\" (1.702 m)       Current monitoring regimen: home blood tests - 4 times daily  Home blood sugar trends: FBS's 119-191. Lunch 140-097. Dinner 296-922. ,216  Any episodes of hypoglycemia? yes - 1-2 per week; highest risk after skipping a meal  Previous visit with dietician: yes - 2020  Current diet: B eggs/ oatmeal or fruit                  L skips 2 days per week                      Tuna sandwich or crackers/ salad                  D chicken breast/BBQ sauce/ b potato/ green beans                 Apples/ oranges/ random peanut butter  Current exercise: outside a lot in the past week; walking the dogs  Eye exam current (within one year): yes. Glaucoma/ cataracts/ stents 2020-left eye 2020 right eye. Follow up every 3 months. Dr. Hari Hernandez 10/2020. Appt 2020. Any history of foot problems? yes. Hx multiple amputations bilat  Last foot exam: 2020 Dr. Lul Weaver. No problems at thist iem  Immunizations up to date: no;   Taking ASA:  Yes  Appropriate for use of MyChart Glucose Grid:  Yes    Focus:     Diabetes Education. Recent A1C 7.4%.  Weight is down 6# from last visit. Dee has gotten off track with exercise, but doing fairly well with meal plan. She is tolerating Ozempic well, but even with GLP1 and SGLT2 therapy, she is requiring a lot of insulin. Fasting readings are above goal, as well as AC noon meal. Dinner readings are up and down, some r/t whether she ate lunch or not. She could benefit from increase dose of Ozempic. Discussed need to get exercise efforts back in place. She is not getting results for coverage for her Dexcom supplies and Tandem insulin pump. She has been told by [employer] that the upgraded system with CGM would be covered by her insurance. She will rquest that Medtronic follow up and initiate this process if Medtronic system will be covered. Much encouragement given. Follow up 3 months. DSME PLAN:   Discussed general issues about diabetes pathophysiology and management. Counseling at today's visit: BG goals; carbs; exercise; oxempic SE's; pump/CGM system; basal vs bolus. Call [employer] about your insurance coverage for the Medtronic 670 or 770G and the continuous glucose monitor (closed loop system)                 --is it covered by your insurance? ?           Let the clinic know if we need to do anything to get this process going! Consider basal rates 12am 3.4 units and 6am 3.3 units                       Carb ratio 10am to 1.6 grams  Try to check more blood sugars at bedtime. We need to know what your blood          Sugars are doing after supper to bedtime  Exercise every day Walk the dog; walk outdoors; or exercise video --at least                   10  Minutes. Set a time--a goal so you r don't miss your exercise                     Each day --right after light lunch  We will ask Dr. Genna Moctezuma about Ozempic 1mg daily  A low carb meal plan is good. Eat 3 meals per day--even 15-20 grams/ protein    Meter download, medications, PMH and nursing assessment reviewed.   Rebecca Goodson states She is willing to participate in this plan of care and verbalized understanding of all instructions provided. Teach back used to verify comprehension. Total time involved in direct patient education: 60 minutes.

## 2021-01-20 LAB
AVERAGE GLUCOSE: 197
BASOPHILS ABSOLUTE: NORMAL
BASOPHILS RELATIVE PERCENT: NORMAL
EOSINOPHILS ABSOLUTE: NORMAL
EOSINOPHILS RELATIVE PERCENT: NORMAL
HBA1C MFR BLD: 8.5 %
HCT VFR BLD CALC: 38.3 % (ref 36–46)
HEMOGLOBIN: 12.7 G/DL (ref 12–16)
LYMPHOCYTES ABSOLUTE: NORMAL
LYMPHOCYTES RELATIVE PERCENT: NORMAL
MCH RBC QN AUTO: 28.6 PG
MCHC RBC AUTO-ENTMCNC: 33 G/DL
MCV RBC AUTO: 86.7 FL
MONOCYTES ABSOLUTE: NORMAL
MONOCYTES RELATIVE PERCENT: NORMAL
NEUTROPHILS ABSOLUTE: NORMAL
NEUTROPHILS RELATIVE PERCENT: NORMAL
PDW BLD-RTO: 14.3 %
PLATELET # BLD: 244 K/ΜL
PMV BLD AUTO: NORMAL FL
RBC # BLD: 4.42 10^6/ΜL
WBC # BLD: 6.3 10^3/ML

## 2021-02-11 ENCOUNTER — OFFICE VISIT (OUTPATIENT)
Dept: INTERNAL MEDICINE CLINIC | Age: 51
End: 2021-02-11
Payer: MEDICAID

## 2021-02-11 DIAGNOSIS — E11.69 TYPE 2 DIABETES MELLITUS WITH OTHER SPECIFIED COMPLICATION, WITH LONG-TERM CURRENT USE OF INSULIN (HCC): Primary | ICD-10-CM

## 2021-02-11 DIAGNOSIS — Z79.4 TYPE 2 DIABETES MELLITUS WITH OTHER SPECIFIED COMPLICATION, WITH LONG-TERM CURRENT USE OF INSULIN (HCC): Primary | ICD-10-CM

## 2021-02-11 LAB — HBA1C MFR BLD: 6.9 % (ref 4.3–5.7)

## 2021-02-11 PROCEDURE — 83036 HEMOGLOBIN GLYCOSYLATED A1C: CPT | Performed by: INTERNAL MEDICINE

## 2021-02-11 PROCEDURE — G0108 DIAB MANAGE TRN  PER INDIV: HCPCS | Performed by: INTERNAL MEDICINE

## 2021-02-11 NOTE — PROGRESS NOTES
[facility]  [facility], [place], operated by [facility], [address]  [phone] (phone)  [phone] (fax)    Patient ID: Hayde Armas 1970  Referring Provider: Dr. Sharron Garcia     Patient's name and  were verified. Subjective:    She presents for Her follow-up diabetic visit. She has type 2 diabetes mellitus. Home regimen includes: insulin  GLP-1 agonist and SGLT2. She is noncompliant some of the time. Assessment:     Lab Results   Component Value Date    LABA1C 7.4 10/19/2020    LABA1C 6.6 2020    BUN 9 2019    CREATININE 0.68 2019     There were no vitals filed for this visit. Wt Readings from Last 3 Encounters:   11/10/20 259 lb 9.6 oz (117.8 kg)   10/19/20 262 lb (118.8 kg)   20 258 lb 6.4 oz (117.2 kg)     Ht Readings from Last 3 Encounters:   11/10/20 5' 7\" (1.702 m)   10/19/20 5' 6\" (1.676 m)   20 5' 7\" (1.702 m)       Glucose at 0.5 hrs PPD today resulted at 199mg/dl  Current monitoring regimen: home blood tests - 4 times daily  Home blood sugar trends:   Any episodes of hypoglycemia? yes - less than once weekly   Previous visit with dietician: yes - 2020  Current diet: B egg/ toast/ coffee                   L quesidilla with rice/ beans/ meat/ cheese                   D meatloaf, 1/2 b potato, green beans                   Snacking- none 3 days per week or apples oranges/ pb crackers 4 pk/ egg  Current exercise: chair exercise 2-3 times per week; 30 minutes  Eye exam current (within one year): yes Dr. Lorrie Costello 2020 h glaucoma/ stents/ catracts. Left eye. Next 2020  Any history of foot problems? yes; right 1st toe amputation 2021  Last foot exam: last seen by Dr. Kevin Powers today; healing well. Stitches out to day.  F/u  3 weeks  Immunizations up to date: yes -   Taking ASA:  Yes  Appropriate for use of MyChart Glucose Grid:  Yes    Focus: Diabetes education. A1C today 6.9%. Weight is down 4# from last visit at the clinic. Dee has had another toe amputated 1/2021. We need to keep focusing on glucose control and weight loss. She is still following closely with Dr. Rd Arenas. We are still working on the process of getting her Medtronic pump updated. Dee agrees to stay in touch with [employer] regarding the process. Much encoaurgement given. Follow up 3 months. DSME PLAN:   Discussed general issues about diabetes pathophysiology and management. Counseling at today's visit: Reviewed BG goals; txing low BS; prevent complciations; exercise; meal planning/ carbs. Basal rate 5pm 3.8 units, sensitivity 4pm 10 grams  Stop sweets and snacking in the evening         --make a point to eat dinner 4-6pm. Set an alarm as needed  If you crave sweets--sugar jello with cool whip or crystal light slushie                            Or piece of fruit (bolus for it)  Get your chair exercises every day (6 out of 7 days)  Talk to your family doctor about a counselor  Think about setting a 120% temporary basal if you are going to lay down in the           Afternoon-duration 2 hours  Meter download, medications, PMH and nursing assessment reviewed. Elias Kirkpatrick states She is willing to participate in this plan of care and verbalized understanding of all instructions provided. Teach back used to verify comprehension. Total time involved in direct patient education: 60 minutes.

## 2021-02-11 NOTE — PATIENT INSTRUCTIONS
Basal rate 5pm 3.8 units, sensitivity 4pm 10 grams  Stop sweets and snacking in the evening         --make a point to eat dinner 4-6pm. Set an alarm as needed  If you crave sweets--sugar jello with cool whip or crystal light slushie                            Or piece of fruit (bolus for it)  Get your chair exercises every day (6 out of 7 days)  Talk to your family doctor about a counselor  Think about setting a 120% temporary basal if you are going to lay down in the           Afternoon-duration 2 hours

## 2021-02-12 VITALS
DIASTOLIC BLOOD PRESSURE: 86 MMHG | HEIGHT: 66 IN | SYSTOLIC BLOOD PRESSURE: 133 MMHG | HEART RATE: 85 BPM | TEMPERATURE: 97.2 F | WEIGHT: 258.2 LBS | BODY MASS INDEX: 41.5 KG/M2

## 2021-05-12 ENCOUNTER — TELEPHONE (OUTPATIENT)
Dept: INTERNAL MEDICINE CLINIC | Age: 51
End: 2021-05-12

## 2021-05-12 ENCOUNTER — OFFICE VISIT (OUTPATIENT)
Dept: INTERNAL MEDICINE CLINIC | Age: 51
End: 2021-05-12

## 2021-05-12 VITALS
BODY MASS INDEX: 40.87 KG/M2 | WEIGHT: 260.4 LBS | SYSTOLIC BLOOD PRESSURE: 90 MMHG | DIASTOLIC BLOOD PRESSURE: 70 MMHG | TEMPERATURE: 97.2 F | HEART RATE: 76 BPM | HEIGHT: 67 IN

## 2021-05-12 DIAGNOSIS — E11.69 TYPE 2 DIABETES MELLITUS WITH OTHER SPECIFIED COMPLICATION, WITH LONG-TERM CURRENT USE OF INSULIN (HCC): Primary | ICD-10-CM

## 2021-05-12 DIAGNOSIS — Z79.4 TYPE 2 DIABETES MELLITUS WITH OTHER SPECIFIED COMPLICATION, WITH LONG-TERM CURRENT USE OF INSULIN (HCC): Primary | ICD-10-CM

## 2021-05-12 LAB — HBA1C MFR BLD: 7 % (ref 4.3–5.7)

## 2021-05-12 PROCEDURE — 83036 HEMOGLOBIN GLYCOSYLATED A1C: CPT | Performed by: INTERNAL MEDICINE

## 2021-05-12 PROCEDURE — G0108 DIAB MANAGE TRN  PER INDIV: HCPCS | Performed by: INTERNAL MEDICINE

## 2021-05-12 RX ORDER — FUROSEMIDE 40 MG/1
40 TABLET ORAL DAILY
COMMUNITY
Start: 2021-04-14

## 2021-05-12 NOTE — TELEPHONE ENCOUNTER
Diabetes education. Blood sugars FBS's 119-241. Noon . Dinner . Imani Quintanilla,      Please authorize the [facility] at [address] [place] [address] to teach/ assist Dee to make the following insulin pump setting changes:  Basal rate 12am 3.4 units --incr 3.5 units                    6am 3.3 units                    9am 3.5 units                    4pm 3.8 units  --incr 3.9 units  Sensitivity 12am 17mg  --incr 15 mg                    10am 14mg   --incr 12 mg                    4pm 12mg. If you agree, please note and return. Thank you.

## 2021-05-12 NOTE — PROGRESS NOTES
[facility]  [address] [address] [facility], [address]  [phone] (phone)  [phone] (fax)    Patient ID: Gale Iyer 1970  Referring Provider: Dr. Lesli Alvares     Patient's name and  were verified. Subjective:    She presents for Her follow-up diabetic visit. She has type 2 diabetes mellitus. Home regimen includes: insulin  GLP-1 agonist and SGLT2;  She is noncompliant some of the time. Assessment:     Lab Results   Component Value Date    LABA1C 6.9 2021    LABA1C 8.5 2021    BUN 9 2019    CREATININE 0.68 2019     There were no vitals filed for this visit. Wt Readings from Last 3 Encounters:   21 258 lb 3.2 oz (117.1 kg)   11/10/20 259 lb 9.6 oz (117.8 kg)   10/19/20 262 lb (118.8 kg)     Ht Readings from Last 3 Encounters:   21 5' 6\" (1.676 m)   11/10/20 5' 7\" (1.702 m)   10/19/20 5' 6\" (1.676 m)       Glucose at 1 hrs PPD today resulted at 121mg/dl  Current monitoring regimen: home blood tests - 4 times daily/Dexcom CGM  Home blood sugar trends: FBS's 119-241. Noon . Dinner . BT  216-225  Any episodes of hypoglycemia? yes - less than once weekly  Previous visit with dietician: yes - 2020  Current diet: B 2 boiled eggs                       L chicken sandwich                        D fried cabbage/ peppers/ sausage                       Snacking- ususaly evening once daily--popcorn/ celery              *Healthy Essentials 1 meal per day through [place]  Current exercise: walking 10-15 minutes; every other day  Eye exam current (within one year): yes 2020 Dr. Jennifer Amin. Glaucoma/ cataracts. Left eye. F/u 2020  Any history of foot problems? yes  Last foot exam: Dr. Renny Hernandez . Script for new insoles. Immunizations up to date: yes -   Taking ASA:  Yes   Appropriate for use of MyChart Glucose Grid:  Yes    Focus:     Diabetes education. A1C today 7.0%. Weight is stable.  Dee continues to wait for coverage for her insulin pump to upgrade. She recently has been changed to [employer] disability. She has has to start the pump request over again. They are requiring an updated CPeptide/ FBS for coverage--ordered. Dee is getting low amounts of exercise, she struggles some days with food choices and she is often forgetting to bolus with meals. She has reason to be disappointed with Medtr[employer], but  Reinforced that she has the power to do better for herself. She has to get exercise and she has to bolus for everything she eats. Also discussed routine of BG checks before meals and bedtime. Most glucose levels are above goal. Follow up 3 months. DSME PLAN:   Discussed general issues about diabetes pathophysiology and management. Counseling at today's visit: BG goals; exercise; bolusing for meals; carbs; txing low BS. Bolus for all carbohydrates eaten               -if you forget to bolus before you eat--try to bolus anytime within             1 hour after eating  Keep walking every day! ! Also--use water bottles or stretch band and do 10 minutes of            Of upper body exercise when you FIRST sit down  Basal rates 12am 3.5 units and 4pm 3.9 units  Sensitivity 12am 15 mg and 10am 12mg  Keep thinking healthy eating; lower carbs; small bedtime snacks  Goal:  Try to get 2-5 pounds off by your next visit  Talk your doctor about your Vitamin D  Labs ordered for CPeptide/ fasting blood sugar for pump coverage  Meter download, medications, PMH and nursing assessment reviewed. Emely Purdy states She is willing to participate in this plan of care and verbalized understanding of all instructions provided. Teach back used to verify comprehension. Total time involved in direct patient education: 60 minutes.

## 2021-05-12 NOTE — PATIENT INSTRUCTIONS
Bolus for all carbohydrates eaten               -if you forget to bolus before you eat--try to bolus anytime within             1 hour after eating  Keep walking every day! !              Also--use water bottles or stretch band and do 10 minutes of            Of upper body exercise when you FIRST sit down  Basal rates 12am 3.5 units and 4pm 3.9 units  Sensitivity 12am 15 mg and 10am 12mg  Keep thinking healthy eating; lower carbs; small bedtime snacks  Goal:  Try to get 2-5 pounds off by your next visit  Talk your doctor about your Vitamin D  Labs ordered for CPeptide/ fasting blood sugar for pump coverage

## 2021-05-17 LAB — GLUCOSE, FASTING: 96 MG/DL (ref 70–110)

## 2021-05-17 NOTE — TELEPHONE ENCOUNTER
Dee instructed on insulin pump setting changes as directed. Basal rate 12am 3.4 units --incr 3.5 units                    6am 3.3 units                    9am 3.5 units                    4pm 3.8 units  --incr 3.9 units  Sensitivity 12am 17mg  --incr 15 mg                    10am 14mg   --incr 12 mg  Dee voiced understanding via teach back.

## 2021-05-19 LAB — C-PEPTIDE: 1.3 NG/ML (ref 1.1–4.4)

## 2021-05-26 ENCOUNTER — OFFICE VISIT (OUTPATIENT)
Dept: INTERNAL MEDICINE CLINIC | Age: 51
End: 2021-05-26

## 2021-05-26 VITALS
SYSTOLIC BLOOD PRESSURE: 109 MMHG | HEART RATE: 76 BPM | HEIGHT: 67 IN | TEMPERATURE: 97.7 F | BODY MASS INDEX: 40.97 KG/M2 | DIASTOLIC BLOOD PRESSURE: 75 MMHG | WEIGHT: 261 LBS

## 2021-05-26 DIAGNOSIS — Z89.9 STATUS POST AMPUTATION: ICD-10-CM

## 2021-05-26 DIAGNOSIS — Z89.422 STATUS POST AMPUTATION OF TOE OF LEFT FOOT (HCC): Primary | ICD-10-CM

## 2021-05-26 DIAGNOSIS — E66.01 MORBID OBESITY WITH BMI OF 40.0-44.9, ADULT (HCC): ICD-10-CM

## 2021-05-26 DIAGNOSIS — E11.69 TYPE 2 DIABETES MELLITUS WITH OTHER SPECIFIED COMPLICATION, WITH LONG-TERM CURRENT USE OF INSULIN (HCC): ICD-10-CM

## 2021-05-26 DIAGNOSIS — I10 ESSENTIAL HYPERTENSION: ICD-10-CM

## 2021-05-26 DIAGNOSIS — Z96.41 INSULIN PUMP IN PLACE: ICD-10-CM

## 2021-05-26 DIAGNOSIS — Z79.4 TYPE 2 DIABETES MELLITUS WITH OTHER SPECIFIED COMPLICATION, WITH LONG-TERM CURRENT USE OF INSULIN (HCC): ICD-10-CM

## 2021-05-26 PROCEDURE — 99213 OFFICE O/P EST LOW 20 MIN: CPT | Performed by: INTERNAL MEDICINE

## 2021-05-26 PROCEDURE — 3051F HG A1C>EQUAL 7.0%<8.0%: CPT | Performed by: INTERNAL MEDICINE

## 2021-05-26 SDOH — ECONOMIC STABILITY: FOOD INSECURITY: WITHIN THE PAST 12 MONTHS, THE FOOD YOU BOUGHT JUST DIDN'T LAST AND YOU DIDN'T HAVE MONEY TO GET MORE.: PATIENT DECLINED

## 2021-05-26 SDOH — ECONOMIC STABILITY: FOOD INSECURITY: WITHIN THE PAST 12 MONTHS, YOU WORRIED THAT YOUR FOOD WOULD RUN OUT BEFORE YOU GOT MONEY TO BUY MORE.: PATIENT DECLINED

## 2021-05-26 ASSESSMENT — PATIENT HEALTH QUESTIONNAIRE - PHQ9
SUM OF ALL RESPONSES TO PHQ QUESTIONS 1-9: 0
SUM OF ALL RESPONSES TO PHQ QUESTIONS 1-9: 0
2. FEELING DOWN, DEPRESSED OR HOPELESS: 0
SUM OF ALL RESPONSES TO PHQ9 QUESTIONS 1 & 2: 0
1. LITTLE INTEREST OR PLEASURE IN DOING THINGS: 0
SUM OF ALL RESPONSES TO PHQ QUESTIONS 1-9: 0

## 2021-05-26 ASSESSMENT — SOCIAL DETERMINANTS OF HEALTH (SDOH): HOW HARD IS IT FOR YOU TO PAY FOR THE VERY BASICS LIKE FOOD, HOUSING, MEDICAL CARE, AND HEATING?: PATIENT DECLINED

## 2021-06-01 DIAGNOSIS — E11.69 TYPE 2 DIABETES MELLITUS WITH OTHER SPECIFIED COMPLICATION, WITH LONG-TERM CURRENT USE OF INSULIN (HCC): ICD-10-CM

## 2021-06-01 DIAGNOSIS — Z79.4 TYPE 2 DIABETES MELLITUS WITH OTHER SPECIFIED COMPLICATION, WITH LONG-TERM CURRENT USE OF INSULIN (HCC): ICD-10-CM

## 2021-06-01 RX ORDER — CALCIUM CITRATE/VITAMIN D3 200MG-6.25
1 TABLET ORAL
Qty: 300 EACH | Refills: 5 | Status: SHIPPED | OUTPATIENT
Start: 2021-06-01 | End: 2021-09-30 | Stop reason: ALTCHOICE
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
## 2021-06-14 ENCOUNTER — TELEPHONE (OUTPATIENT)
Dept: INTERNAL MEDICINE CLINIC | Age: 51
End: 2021-06-14

## 2021-06-14 DIAGNOSIS — E11.69 TYPE 2 DIABETES MELLITUS WITH OTHER SPECIFIED COMPLICATION, WITH LONG-TERM CURRENT USE OF INSULIN (HCC): Primary | ICD-10-CM

## 2021-06-14 DIAGNOSIS — Z79.4 TYPE 2 DIABETES MELLITUS WITH OTHER SPECIFIED COMPLICATION, WITH LONG-TERM CURRENT USE OF INSULIN (HCC): Primary | ICD-10-CM

## 2021-06-14 NOTE — TELEPHONE ENCOUNTER
CPeptide and fasting glucose obtained did not qualify for Medicare coverage of insulin pump upgrade. Her pump is in need of replacement. CPeptide was just above qualifications and recommendation given to repeat CPeptide to ensure if she does or does not qualify for coverage. Lab orders given for CPeptide/ fasting glucose--mailed to Dee.

## 2021-06-30 DIAGNOSIS — Z79.4 TYPE 2 DIABETES MELLITUS WITH OTHER SPECIFIED COMPLICATION, WITH LONG-TERM CURRENT USE OF INSULIN (HCC): Primary | ICD-10-CM

## 2021-06-30 DIAGNOSIS — E11.69 TYPE 2 DIABETES MELLITUS WITH OTHER SPECIFIED COMPLICATION, WITH LONG-TERM CURRENT USE OF INSULIN (HCC): Primary | ICD-10-CM

## 2021-06-30 RX ORDER — EMPAGLIFLOZIN 25 MG/1
25 TABLET, FILM COATED ORAL DAILY
Qty: 90 TABLET | Refills: 3 | Status: SHIPPED | OUTPATIENT
Start: 2021-06-30 | End: 2022-06-30

## 2021-07-08 ENCOUNTER — TELEPHONE (OUTPATIENT)
Dept: INTERNAL MEDICINE CLINIC | Age: 51
End: 2021-07-08

## 2021-07-08 LAB — GLUCOSE: 30 MG/DL (ref 70–110)

## 2021-07-08 NOTE — TELEPHONE ENCOUNTER
Ethyl Haw from [facility] lab called with a critical glucose of 30. Lab was drawn at 9:30am and they got results around 5:00pm they attempted to contact the office but our office was closed and the answering service did not know how to transfer to on call physician. Dr. Mason Guajardo informed. Leonides Corrales LPN called and left patient a message to call the office.

## 2021-07-09 LAB — C-PEPTIDE: 0.6 NG/ML (ref 1.1–4.4)

## 2021-09-08 ENCOUNTER — TELEPHONE (OUTPATIENT)
Dept: INTERNAL MEDICINE CLINIC | Age: 51
End: 2021-09-08

## 2021-09-08 RX ORDER — SUBCUTANEOUS INSULIN PUMP
EACH MISCELLANEOUS
Qty: 1 KIT | Refills: 0 | Status: SHIPPED | OUTPATIENT
Start: 2021-09-08

## 2021-09-09 LAB
AVERAGE GLUCOSE: NORMAL
HBA1C MFR BLD: 6.9 %

## 2021-09-30 ENCOUNTER — OFFICE VISIT (OUTPATIENT)
Dept: INTERNAL MEDICINE CLINIC | Age: 51
End: 2021-09-30

## 2021-09-30 VITALS — BODY MASS INDEX: 41.46 KG/M2 | HEIGHT: 66 IN | WEIGHT: 258 LBS | TEMPERATURE: 97.8 F

## 2021-09-30 DIAGNOSIS — E11.69 TYPE 2 DIABETES MELLITUS WITH OTHER SPECIFIED COMPLICATION, WITH LONG-TERM CURRENT USE OF INSULIN (HCC): Primary | ICD-10-CM

## 2021-09-30 DIAGNOSIS — Z79.4 TYPE 2 DIABETES MELLITUS WITH OTHER SPECIFIED COMPLICATION, WITH LONG-TERM CURRENT USE OF INSULIN (HCC): Primary | ICD-10-CM

## 2021-09-30 RX ORDER — CHOLECALCIFEROL (VITAMIN D3) 100000/G
POWDER (GRAM) MISCELLANEOUS DAILY
COMMUNITY
End: 2021-11-04 | Stop reason: ALTCHOICE

## 2021-09-30 NOTE — PATIENT INSTRUCTIONS
Call with any questions about your new pump  We will get you connected with your medtronic mobile harper  Call [employer] 24/7 with any questions

## 2021-09-30 NOTE — PROGRESS NOTES
Dee present, with daughter for pump upgrade instructions on Medtronic 770G insulin pump. Blood sugar today 76 at 3.5 hrpp. Pump instructions completed per [employer] checklist and scanned in to EPIC chart. Instructed to check blood sugars 4 times per day and to call blood sugars to Diabetes clinic as needed . Follow up appointment with RN 1 month for follow up . Ortega Peña states She is willing to participate in this plan of care and verbalized understanding of all instructions provided. Teach back used to verify comprehension. Total time involved in direct patient education: 90 minutes.     PUMP SETTINGS:            Basal rate: 0000 is 3.5  Units/h, 6am 3.3 units, 9am 3.5 units, 4pm 3.9 units            Carbohydrate ratio: 0000 is 1.6 grams/unit, 10am 1.6 grams, 4pm 1 gram.                         Insulin sensitivity: 0000 is 15 mg/dL per unit, 10am 12 mg, 4pm 12 mg      Blood sugar goal: 100- 130 mg      Active insulin: 4 hrs                       Establish "[employer] mobile harper and carelink connection

## 2021-09-30 NOTE — TELEPHONE ENCOUNTER
Dee has been trained on her new Medtronic 770G insulin pump. She has a new linked meter to the pump system-the Accu Chek Guide. She needs script for the Accu Guide strips and lancets for Fastclicks.

## 2021-10-05 RX ORDER — LANCETS
1 EACH MISCELLANEOUS 4 TIMES DAILY
Qty: 200 EACH | Refills: 3 | Status: SHIPPED | OUTPATIENT
Start: 2021-10-05 | End: 2022-04-13 | Stop reason: SDUPTHER

## 2021-10-05 RX ORDER — BLOOD SUGAR DIAGNOSTIC
1 STRIP MISCELLANEOUS 4 TIMES DAILY
Qty: 100 EACH | Refills: 3 | Status: SHIPPED | OUTPATIENT
Start: 2021-10-05 | End: 2022-01-25

## 2021-11-04 ENCOUNTER — OFFICE VISIT (OUTPATIENT)
Dept: INTERNAL MEDICINE CLINIC | Age: 51
End: 2021-11-04
Payer: MEDICARE

## 2021-11-04 VITALS
HEART RATE: 60 BPM | BODY MASS INDEX: 42.01 KG/M2 | TEMPERATURE: 97.2 F | DIASTOLIC BLOOD PRESSURE: 74 MMHG | SYSTOLIC BLOOD PRESSURE: 130 MMHG | WEIGHT: 261.4 LBS | HEIGHT: 66 IN

## 2021-11-04 DIAGNOSIS — I10 PRIMARY HYPERTENSION: ICD-10-CM

## 2021-11-04 DIAGNOSIS — Z79.4 TYPE 2 DIABETES MELLITUS WITH OTHER SPECIFIED COMPLICATION, WITH LONG-TERM CURRENT USE OF INSULIN (HCC): Primary | ICD-10-CM

## 2021-11-04 DIAGNOSIS — E11.69 TYPE 2 DIABETES MELLITUS WITH OTHER SPECIFIED COMPLICATION, WITH LONG-TERM CURRENT USE OF INSULIN (HCC): Primary | ICD-10-CM

## 2021-11-04 DIAGNOSIS — E66.01 CLASS 3 SEVERE OBESITY DUE TO EXCESS CALORIES WITH SERIOUS COMORBIDITY IN ADULT, UNSPECIFIED BMI (HCC): ICD-10-CM

## 2021-11-04 PROBLEM — E66.813 CLASS 3 SEVERE OBESITY DUE TO EXCESS CALORIES IN ADULT: Status: ACTIVE | Noted: 2021-11-04

## 2021-11-04 PROCEDURE — 99213 OFFICE O/P EST LOW 20 MIN: CPT | Performed by: INTERNAL MEDICINE

## 2021-11-04 RX ORDER — BLOOD-GLUCOSE SENSOR
EACH MISCELLANEOUS
COMMUNITY
Start: 2021-10-07 | End: 2022-04-19 | Stop reason: SDUPTHER
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
## 2021-12-13 ENCOUNTER — HOSPITAL ENCOUNTER (OUTPATIENT)
Age: 51
Setting detail: SPECIMEN
Discharge: HOME | End: 2021-12-13

## 2021-12-14 LAB
ABSOLUTE EOS #: 0.23 K/UL (ref 0–0.44)
ABSOLUTE IMMATURE GRANULOCYTE: <0.03 K/UL (ref 0–0.3)
ABSOLUTE LYMPH #: 2.7 K/UL (ref 1.1–3.7)
ABSOLUTE MONO #: 0.44 K/UL (ref 0.1–1.2)
ALBUMIN SERPL-MCNC: 3.9 G/DL (ref 3.5–5.2)
ALBUMIN/GLOBULIN RATIO: 1.1 (ref 1–2.5)
ALP BLD-CCNC: 77 U/L (ref 35–104)
ALT SERPL-CCNC: 22 U/L (ref 5–33)
ANION GAP SERPL CALCULATED.3IONS-SCNC: 12 MMOL/L (ref 9–17)
AST SERPL-CCNC: 22 U/L
BASOPHILS # BLD: 1 % (ref 0–2)
BASOPHILS ABSOLUTE: 0.04 K/UL (ref 0–0.2)
BILIRUB SERPL-MCNC: 0.17 MG/DL (ref 0.3–1.2)
BUN BLDV-MCNC: 9 MG/DL (ref 6–20)
BUN/CREAT BLD: ABNORMAL (ref 9–20)
CALCIUM SERPL-MCNC: 9.6 MG/DL (ref 8.6–10.4)
CHLORIDE BLD-SCNC: 103 MMOL/L (ref 98–107)
CHOLESTEROL, FASTING: 105 MG/DL
CHOLESTEROL/HDL RATIO: 3.3
CO2: 26 MMOL/L (ref 20–31)
CREAT SERPL-MCNC: 0.69 MG/DL (ref 0.5–0.9)
DIFFERENTIAL TYPE: NORMAL
EOSINOPHILS RELATIVE PERCENT: 3 % (ref 1–4)
GFR AFRICAN AMERICAN: >60 ML/MIN
GFR NON-AFRICAN AMERICAN: >60 ML/MIN
GFR SERPL CREATININE-BSD FRML MDRD: ABNORMAL ML/MIN/{1.73_M2}
GFR SERPL CREATININE-BSD FRML MDRD: ABNORMAL ML/MIN/{1.73_M2}
GLUCOSE BLD-MCNC: 142 MG/DL (ref 70–99)
HCT VFR BLD CALC: 46.5 % (ref 36.3–47.1)
HDLC SERPL-MCNC: 32 MG/DL
HEMOGLOBIN: 14.7 G/DL (ref 11.9–15.1)
IMMATURE GRANULOCYTES: 0 %
LDL CHOLESTEROL: 33 MG/DL (ref 0–130)
LYMPHOCYTES # BLD: 34 % (ref 24–43)
MCH RBC QN AUTO: 29.8 PG (ref 25.2–33.5)
MCHC RBC AUTO-ENTMCNC: 31.6 G/DL (ref 28.4–34.8)
MCV RBC AUTO: 94.1 FL (ref 82.6–102.9)
MONOCYTES # BLD: 6 % (ref 3–12)
NRBC AUTOMATED: 0 PER 100 WBC
PDW BLD-RTO: 13.7 % (ref 11.8–14.4)
PLATELET # BLD: 284 K/UL (ref 138–453)
PLATELET ESTIMATE: NORMAL
PMV BLD AUTO: 10.8 FL (ref 8.1–13.5)
POTASSIUM SERPL-SCNC: 5 MMOL/L (ref 3.7–5.3)
RBC # BLD: 4.94 M/UL (ref 3.95–5.11)
RBC # BLD: NORMAL 10*6/UL
SEG NEUTROPHILS: 56 % (ref 36–65)
SEGMENTED NEUTROPHILS ABSOLUTE COUNT: 4.49 K/UL (ref 1.5–8.1)
SODIUM BLD-SCNC: 141 MMOL/L (ref 135–144)
TOTAL PROTEIN: 7.4 G/DL (ref 6.4–8.3)
TRIGLYCERIDE, FASTING: 201 MG/DL
TSH SERPL DL<=0.05 MIU/L-ACNC: 1.64 MIU/L (ref 0.3–5)
VLDLC SERPL CALC-MCNC: ABNORMAL MG/DL (ref 1–30)
WBC # BLD: 7.9 K/UL (ref 3.5–11.3)
WBC # BLD: NORMAL 10*3/UL

## 2022-01-27 RX ORDER — BLOOD SUGAR DIAGNOSTIC
STRIP MISCELLANEOUS
Qty: 100 STRIP | Refills: 3 | Status: SHIPPED | OUTPATIENT
Start: 2022-01-27 | End: 2022-05-25

## 2022-02-07 ENCOUNTER — OFFICE VISIT (OUTPATIENT)
Dept: INTERNAL MEDICINE CLINIC | Age: 52
End: 2022-02-07
Payer: MEDICARE

## 2022-02-07 DIAGNOSIS — Z79.4 TYPE 2 DIABETES MELLITUS WITH OTHER SPECIFIED COMPLICATION, WITH LONG-TERM CURRENT USE OF INSULIN (HCC): ICD-10-CM

## 2022-02-07 DIAGNOSIS — E11.69 TYPE 2 DIABETES MELLITUS WITH OTHER SPECIFIED COMPLICATION, WITH LONG-TERM CURRENT USE OF INSULIN (HCC): ICD-10-CM

## 2022-02-07 PROCEDURE — G0108 DIAB MANAGE TRN  PER INDIV: HCPCS | Performed by: INTERNAL MEDICINE

## 2022-02-07 NOTE — PATIENT INSTRUCTIONS
1. Basal Rates:     12a: 3.5     6a: 3.3     9a: 3.5 -> 3.35     4p: 3.9      2. Highs after lunch and dinner mostly caused by missed bolus/under-bolusing   -Be sure to enter carbs into your pump for all meals and snacks   -Use reminders to be sure to enter your boluses - sticky notes, missed bolus reminders    3.  Try to move dinner time up to 5-6p to avoid eating right before bed

## 2022-02-07 NOTE — PROGRESS NOTES
[facility]  [address], [facility] [place], [address]  [phone] (phone)  [phone] (fax)    Patient ID: Brea Mccurdy 1970  Referring Provider: Dr. Leisa Dandy     Patient's name and  were verified. Subjective:    She presents for Her follow-up diabetic visit. She has type 2 diabetes mellitus. Home regimen includes: GLP-1 Agonist and SGLT-2 inhibitors She is compliant most of the time. Assessment:     Lab Results   Component Value Date    LABA1C 6.9 2021    BUN 9 2021    CREATININE 0.69 2021     Vitals:    22 1424   BP: 135/71   Pulse: 72   Temp: 97 °F (36.1 °C)   Weight: 264 lb 3.2 oz (119.8 kg)     Wt Readings from Last 3 Encounters:   22 264 lb 3.2 oz (119.8 kg)   21 261 lb 6.4 oz (118.6 kg)   21 258 lb (117 kg)     Ht Readings from Last 3 Encounters:   21 5' 5.98\" (1.676 m)   21 5' 6\" (1.676 m)   21 5' 7\" (1.702 m)       Diabetes Pharmacotherapy:  Ozempic 1mg weekly  Jardiance 10mg daily  Medtronic 770G insulin pump    Glucose Trends:   Current monitoring regimen: Dexcom  Home blood sugar trends: V. High: 3%, High: 30%, Target: 66%, Low: < 1%   -Highest readings in the late afternoon  Any episodes of hypoglycemia? yes - once/week   -Trend towards lows before lunch   -Treats with honey packets    Lifestyle Factors:   Previous visit with dietician: yes - 2020  Current diet: B: (9:30-10a) coffee w/ 2 packets Splenda, 1 t. Non-dairy creamer + 1 egg, + 1 piece of toast             L: (12:30-1p) salads w/ tuna or chicken (25 units)                       D: (8p) soups (veggie or potato)                        Snacks: after lunch: veggies w/ PB or PB crackers; only if low at bedtime                       Beverages: water, coffee in AM, diet Coke  Current exercise: ADLs; limited by toes, arm/shoulder pain    Health Maintenance:  Eye exam current (within one year): yes - 22  Any history of foot problems?  yes - multiple toes removed  Last foot exam: follows w/ Dr. Iris Salcedo up to date: No - overdue for flu vaccination  Last panel - LDL:   Lab Results   Component Value Date    LDLCALC 69 11/28/2017    LDLDIRECT 64 05/17/2021   Taking ASA:  Yes   Taking statin: atorvastatin   Last urine microalbumin:   Lab Results   Component Value Date    LABMICR 1.67 11/28/2017      Taking ACE/ARB: lisinopril     Focus:   Diabetes Education. Last A1C ~7% (faxed for exact results). Dee's Medtronic pump has been working well for her. Her CGM time in range is 66% for the past 2 weeks, which is above goal; however, per Dee, A1C remains near goal. Kelvin Roberts is concerned by lows in the late afternoon and early evening; while these are not frequent or severe lows, will make mild setting adjustment to reduce risk for lows. Dee's greatest issue is poor meal clearance, mostly due to missed meal boluses or under-dosing for meals (low carb estimates). She also reports decreased activity due to arm/shoulder pain. Reinforced the importance of bolusing before all meals and snacks and reviewed carbohydrate counting for her common meals. Dee has also been eating dinner at 9p, which she self-identified as a source of highs; she is amenable to moving up her dinnertime to 5-6p. DSME PLAN:   Discussed general issues about diabetes pathophysiology and management. Counseling at today's visit: carbohydrate counting, boluses. 1. Basal Rates:     12a: 3.5     6a: 3.3     9a: 3.5 -> 3.35     4p: 3.9      2. Highs after lunch and dinner mostly caused by missed bolus/under-bolusing   -Be sure to enter carbs into your pump for all meals and snacks   -Use reminders to be sure to enter your boluses - sticky notes, missed bolus reminders    3. Try to move dinner time up to 5-6p to avoid eating right before bed    Meter download, medications, PMH and nursing assessment reviewed.   Wilver Watermane states She is willing to participate in this plan of care and verbalized understanding of all instructions provided. Teach back used to verify comprehension. Follow-up: 1 month Dr. Alina Mirza, 3 month [facility] RN    Total time involved in direct patient education: 60 minutes. For Chiki Cespedes in place:   Yes   Recommendation Provided To: Patient/Caregiver: 1 via In person   Intervention Detail: Dose Adjustment: 1, reason: Therapy Optimization   Gap Closed?: No    Intervention Accepted By: Patient/Caregiver: 1   Time Spent (min): [address], PharmD, [address] [place] [address]  Internal Medicine Clinical Pharmacist  [phone]

## 2022-02-11 VITALS
TEMPERATURE: 97 F | HEART RATE: 72 BPM | BODY MASS INDEX: 42.66 KG/M2 | DIASTOLIC BLOOD PRESSURE: 71 MMHG | SYSTOLIC BLOOD PRESSURE: 135 MMHG | WEIGHT: 264.2 LBS

## 2022-02-22 ENCOUNTER — HOSPITAL ENCOUNTER (OUTPATIENT)
Age: 52
Setting detail: SPECIMEN
Discharge: HOME | End: 2022-02-22

## 2022-02-22 LAB
ABSOLUTE EOS #: 0.19 K/UL (ref 0–0.44)
ABSOLUTE IMMATURE GRANULOCYTE: 0.03 K/UL (ref 0–0.3)
ABSOLUTE LYMPH #: 2.42 K/UL (ref 1.1–3.7)
ABSOLUTE MONO #: 0.35 K/UL (ref 0.1–1.2)
BASOPHILS # BLD: 1 % (ref 0–2)
BASOPHILS ABSOLUTE: 0.04 K/UL (ref 0–0.2)
EOSINOPHILS RELATIVE PERCENT: 3 % (ref 1–4)
HCT VFR BLD CALC: 47 % (ref 36.3–47.1)
HEMOGLOBIN: 15.2 G/DL (ref 11.9–15.1)
IMMATURE GRANULOCYTES: 0 %
LYMPHOCYTES # BLD: 31 % (ref 24–43)
MCH RBC QN AUTO: 29.9 PG (ref 25.2–33.5)
MCHC RBC AUTO-ENTMCNC: 32.3 G/DL (ref 28.4–34.8)
MCV RBC AUTO: 92.3 FL (ref 82.6–102.9)
MONOCYTES # BLD: 5 % (ref 3–12)
NRBC AUTOMATED: 0 PER 100 WBC
PDW BLD-RTO: 13.4 % (ref 11.8–14.4)
PLATELET # BLD: 325 K/UL (ref 138–453)
PMV BLD AUTO: 10.3 FL (ref 8.1–13.5)
RBC # BLD: 5.09 M/UL (ref 3.95–5.11)
SEG NEUTROPHILS: 60 % (ref 36–65)
SEGMENTED NEUTROPHILS ABSOLUTE COUNT: 4.69 K/UL (ref 1.5–8.1)
WBC # BLD: 7.7 K/UL (ref 3.5–11.3)

## 2022-02-23 LAB
ALBUMIN SERPL-MCNC: 4.4 G/DL (ref 3.5–5.2)
ALBUMIN/GLOBULIN RATIO: 1.4 (ref 1–2.5)
ALP BLD-CCNC: 82 U/L (ref 35–104)
ALT SERPL-CCNC: 20 U/L (ref 5–33)
ANION GAP SERPL CALCULATED.3IONS-SCNC: 16 MMOL/L (ref 9–17)
AST SERPL-CCNC: 18 U/L
BILIRUB SERPL-MCNC: 0.27 MG/DL (ref 0.3–1.2)
BUN BLDV-MCNC: 12 MG/DL (ref 6–20)
CALCIUM SERPL-MCNC: 9.5 MG/DL (ref 8.6–10.4)
CHLORIDE BLD-SCNC: 102 MMOL/L (ref 98–107)
CO2: 25 MMOL/L (ref 20–31)
CREAT SERPL-MCNC: 0.73 MG/DL (ref 0.5–0.9)
GFR AFRICAN AMERICAN: >60 ML/MIN
GFR NON-AFRICAN AMERICAN: >60 ML/MIN
GFR SERPL CREATININE-BSD FRML MDRD: ABNORMAL ML/MIN/{1.73_M2}
GLUCOSE BLD-MCNC: 60 MG/DL (ref 70–99)
POTASSIUM SERPL-SCNC: 4.6 MMOL/L (ref 3.7–5.3)
SODIUM BLD-SCNC: 143 MMOL/L (ref 135–144)
TOTAL PROTEIN: 7.5 G/DL (ref 6.4–8.3)

## 2022-03-30 ENCOUNTER — TELEPHONE (OUTPATIENT)
Dept: INTERNAL MEDICINE CLINIC | Age: 52
End: 2022-03-30

## 2022-03-30 NOTE — TELEPHONE ENCOUNTER
Patient called in and stated that she needed her pump supplies and DexCom supplies script to be sent to [address] [place] [address] in [facility] due to [facility] not accepting her insurance any longer. We called [facility] and they stated that they are unable to provider the patient with her pump supplies and that she will need to call her insurance company to see which mail order company they prefer. I called patient to let her know that and she stated she would call her insurance company to find out what company they prefer and she will call us to let us know where to send the scripts.

## 2022-04-13 DIAGNOSIS — Z79.4 TYPE 2 DIABETES MELLITUS WITH OTHER SPECIFIED COMPLICATION, WITH LONG-TERM CURRENT USE OF INSULIN (HCC): Primary | ICD-10-CM

## 2022-04-13 DIAGNOSIS — E11.69 TYPE 2 DIABETES MELLITUS WITH OTHER SPECIFIED COMPLICATION, WITH LONG-TERM CURRENT USE OF INSULIN (HCC): Primary | ICD-10-CM

## 2022-04-13 RX ORDER — LANCETS
1 EACH MISCELLANEOUS 4 TIMES DAILY
Qty: 200 EACH | Refills: 3 | Status: SHIPPED | OUTPATIENT
Start: 2022-04-13 | End: 2022-05-05

## 2022-04-19 NOTE — TELEPHONE ENCOUNTER
Dr. Dru Lerner. Dee is requesting a refill for her Dexcom sensors and transmitters. Script attached to encounter.

## 2022-04-20 RX ORDER — BLOOD-GLUCOSE TRANSMITTER
1 EACH MISCELLANEOUS
Qty: 1 EACH | Refills: 3 | Status: SHIPPED | OUTPATIENT
Start: 2022-04-20

## 2022-04-20 RX ORDER — BLOOD-GLUCOSE SENSOR
EACH MISCELLANEOUS
Qty: 3 EACH | Refills: 11 | Status: SHIPPED | OUTPATIENT
Start: 2022-04-20

## 2022-04-22 LAB
AVERAGE GLUCOSE: NORMAL
CREATININE, URINE: NORMAL
HBA1C MFR BLD: 7.8 %
MICROALBUMIN/CREAT 24H UR: 20 MG/G{CREAT}
MICROALBUMIN/CREAT UR-RTO: NORMAL

## 2022-05-04 DIAGNOSIS — E11.69 TYPE 2 DIABETES MELLITUS WITH OTHER SPECIFIED COMPLICATION, WITH LONG-TERM CURRENT USE OF INSULIN (HCC): ICD-10-CM

## 2022-05-04 DIAGNOSIS — Z79.4 TYPE 2 DIABETES MELLITUS WITH OTHER SPECIFIED COMPLICATION, WITH LONG-TERM CURRENT USE OF INSULIN (HCC): ICD-10-CM

## 2022-05-05 RX ORDER — LANCETS
EACH MISCELLANEOUS
Qty: 102 EACH | Refills: 10 | Status: SHIPPED | OUTPATIENT
Start: 2022-05-05 | End: 2022-11-04

## 2022-05-24 ENCOUNTER — OFFICE VISIT (OUTPATIENT)
Dept: INTERNAL MEDICINE CLINIC | Age: 52
End: 2022-05-24
Payer: MEDICARE

## 2022-05-24 ENCOUNTER — TELEPHONE (OUTPATIENT)
Dept: INTERNAL MEDICINE CLINIC | Age: 52
End: 2022-05-24

## 2022-05-24 VITALS
WEIGHT: 262.2 LBS | HEIGHT: 66 IN | TEMPERATURE: 97.7 F | BODY MASS INDEX: 42.14 KG/M2 | DIASTOLIC BLOOD PRESSURE: 60 MMHG | SYSTOLIC BLOOD PRESSURE: 100 MMHG | HEART RATE: 79 BPM

## 2022-05-24 DIAGNOSIS — Z79.4 TYPE 2 DIABETES MELLITUS WITH OTHER SPECIFIED COMPLICATION, WITH LONG-TERM CURRENT USE OF INSULIN (HCC): ICD-10-CM

## 2022-05-24 DIAGNOSIS — E11.69 TYPE 2 DIABETES MELLITUS WITH OTHER SPECIFIED COMPLICATION, WITH LONG-TERM CURRENT USE OF INSULIN (HCC): ICD-10-CM

## 2022-05-24 PROCEDURE — G0108 DIAB MANAGE TRN  PER INDIV: HCPCS | Performed by: INTERNAL MEDICINE

## 2022-05-24 RX ORDER — DOXYCYCLINE HYCLATE 100 MG/1
100 CAPSULE ORAL 2 TIMES DAILY
COMMUNITY
Start: 2022-05-23 | End: 2022-07-18

## 2022-05-24 RX ORDER — HYDROCODONE BITARTRATE AND ACETAMINOPHEN 5; 325 MG/1; MG/1
1 TABLET ORAL PRN
COMMUNITY
Start: 2022-05-03 | End: 2022-07-18

## 2022-05-24 RX ORDER — OXYCODONE HYDROCHLORIDE AND ACETAMINOPHEN 5; 325 MG/1; MG/1
1 TABLET ORAL PRN
COMMUNITY
Start: 2022-05-11 | End: 2022-07-18

## 2022-05-24 ASSESSMENT — PATIENT HEALTH QUESTIONNAIRE - PHQ9
SUM OF ALL RESPONSES TO PHQ QUESTIONS 1-9: 2
SUM OF ALL RESPONSES TO PHQ QUESTIONS 1-9: 2
2. FEELING DOWN, DEPRESSED OR HOPELESS: 1
SUM OF ALL RESPONSES TO PHQ QUESTIONS 1-9: 2
1. LITTLE INTEREST OR PLEASURE IN DOING THINGS: 1
SUM OF ALL RESPONSES TO PHQ QUESTIONS 1-9: 2
SUM OF ALL RESPONSES TO PHQ9 QUESTIONS 1 & 2: 2

## 2022-05-24 NOTE — PROGRESS NOTES
[facility]  [facility]. [address], [facility], [address]  [phone] (phone)  [phone] (fax)    Patient ID: Darlin De La Rosa 1970  Referring Provider: Dr. Latanya Rodriguez    Patient's name and  were verified. Subjective:    She presents for Her follow-up diabetic visit. She has type 2 diabetes mellitus. Home regimen includes: Insulin, GLP-1 Agonist and SGLT-2 inhibitors She is noncompliant some of the time. Assessment:     Lab Results   Component Value Date    LABA1C 6.9 2021    BUN 12 2022    CREATININE 0.73 2022     There were no vitals filed for this visit. Wt Readings from Last 3 Encounters:   22 264 lb 3.2 oz (119.8 kg)   21 261 lb 6.4 oz (118.6 kg)   21 258 lb (117 kg)     Ht Readings from Last 3 Encounters:   21 5' 5.98\" (1.676 m)   21 5' 6\" (1.676 m)   21 5' 7\" (1.702 m)       Diabetes Pharmacotherapy:  Novolog per Medtronic 770G pump  Jardiance 25mg daily  Ozempic 1mg weekly    Glucose Trends:   Glucose at 2 hrs PPD today resulted at 168mg/dl  Current monitoring regimen: Dexcom CGM - checks 4+ times daily  Home blood sugar trends:    -V. High: 7%, High: 32%, Target: 60%, Low: 1%, V. Low: 0%   Any episodes of hypoglycemia? yes - 1-2 times per week; timing random   -Treats with glucose tabs; pb                                     honey    Lifestyle Factors:   Previous visit with dietician: 2020  Current diet: B:-Fasting AM: 2 eggs/ toast    -Before lunch:salad with grilled chicken 12-1pm   -Before dinner: ham sandwich/ cup applesauce 6:30-7pm   -random snacks veggies carrots/ celery/ pnt butter/ clementines            Fun size indigo bar 2   --1-2 times per week      Beverages: diet cranberry juice/ water/ 1-2 bottles per day; 1 cup coffee per day  Current exercise: none    Health Maintenance:  Eye exam current (within one year): yes Date: 2022 Dr. Eloy Kirkpatrick.  Appt 2022 Dr. Darren Borrego; last seen 2021  Any history of foot all carbohydrates eaten-even snacks    Meter download, medications, PMH and nursing assessment reviewed. Sarmad Beck states She is willing to participate in this plan of care and verbalized understanding of all instructions provided. Teach back used to verify comprehension. Follow-up: 3 months    Total time involved in direct patient education: 60 minutes.

## 2022-05-24 NOTE — TELEPHONE ENCOUNTER
Diabetes education. Dexcom -average   Very high 7%, High 32%, in range 60%, low 1%. Very low 0%    Blood sugars are variable; only 1-2 low blood sugars per week or less. She is receptive to the increased dose of Ozempic 2mg if ordered. BMI 42.3    Medications:  Medtronic insulin pump therapy with Novolog  Jardiance 25mg daily  Ozempic 1mg weekly    Dr. Gillian Choudhury,   Please authorize the [facility] at [address] [place] [address] to teach/ assist Dee to increase her Ozempic dose to 2mg weekly for the most potential for BG control and assistance with weight loss efforts. .   If you agree, please note and return. Thank you.

## 2022-05-24 NOTE — PATIENT INSTRUCTIONS
Get back into a routine                 9am breakfast, 12-1lunch, 5-6 dinner  Exercise every day 10-20 minutes         Chair exercise Kwaku Paige) and lifting weights. Stay active. Get focused on your meal plan. 30-45 grams carbohydrate per meal. 1 protein serving.  Low carb                     veggies  Keep your bedtime snack limited to 15 grams carb or less  Bolus for all carbohydrates eaten-even snacks

## 2022-05-25 RX ORDER — BLOOD SUGAR DIAGNOSTIC
STRIP MISCELLANEOUS
Qty: 100 STRIP | Refills: 10 | Status: SHIPPED | OUTPATIENT
Start: 2022-05-25

## 2022-05-25 RX ORDER — INSULIN ASPART 100 [IU]/ML
INJECTION, SOLUTION INTRAVENOUS; SUBCUTANEOUS
Qty: 50 ML | Refills: 10 | Status: SHIPPED | OUTPATIENT
Start: 2022-05-25 | End: 2022-07-18

## 2022-05-26 RX ORDER — SEMAGLUTIDE 2.68 MG/ML
2 INJECTION, SOLUTION SUBCUTANEOUS WEEKLY
Qty: 9 ML | Refills: 3 | Status: SHIPPED | OUTPATIENT
Start: 2022-05-26

## 2022-05-26 NOTE — TELEPHONE ENCOUNTER
Script placed for Ozempic 2mg weekly as directed. Call to [address]. Instructed that 2mg Ozempic weekly has been sent to her pharmacy. Dee voiced understanding via teach back.

## 2022-06-30 DIAGNOSIS — Z79.4 TYPE 2 DIABETES MELLITUS WITH OTHER SPECIFIED COMPLICATION, WITH LONG-TERM CURRENT USE OF INSULIN (HCC): ICD-10-CM

## 2022-06-30 DIAGNOSIS — E11.69 TYPE 2 DIABETES MELLITUS WITH OTHER SPECIFIED COMPLICATION, WITH LONG-TERM CURRENT USE OF INSULIN (HCC): ICD-10-CM

## 2022-07-02 RX ORDER — EMPAGLIFLOZIN 25 MG/1
TABLET, FILM COATED ORAL
Qty: 30 TABLET | Refills: 10 | Status: SHIPPED | OUTPATIENT
Start: 2022-07-02

## 2022-07-18 ENCOUNTER — OFFICE VISIT (OUTPATIENT)
Dept: INTERNAL MEDICINE CLINIC | Age: 52
End: 2022-07-18
Payer: MEDICARE

## 2022-07-18 VITALS
DIASTOLIC BLOOD PRESSURE: 72 MMHG | WEIGHT: 257.2 LBS | TEMPERATURE: 97.4 F | SYSTOLIC BLOOD PRESSURE: 116 MMHG | HEART RATE: 72 BPM | BODY MASS INDEX: 41.51 KG/M2

## 2022-07-18 DIAGNOSIS — I15.0 RENOVASCULAR HYPERTENSION: ICD-10-CM

## 2022-07-18 DIAGNOSIS — Z96.41 INSULIN PUMP IN PLACE: ICD-10-CM

## 2022-07-18 DIAGNOSIS — E11.69 TYPE 2 DIABETES MELLITUS WITH OTHER SPECIFIED COMPLICATION, WITH LONG-TERM CURRENT USE OF INSULIN (HCC): Primary | ICD-10-CM

## 2022-07-18 DIAGNOSIS — Z79.4 TYPE 2 DIABETES MELLITUS WITH OTHER SPECIFIED COMPLICATION, WITH LONG-TERM CURRENT USE OF INSULIN (HCC): Primary | ICD-10-CM

## 2022-07-18 PROCEDURE — 99213 OFFICE O/P EST LOW 20 MIN: CPT | Performed by: INTERNAL MEDICINE

## 2022-07-18 PROCEDURE — 3051F HG A1C>EQUAL 7.0%<8.0%: CPT | Performed by: INTERNAL MEDICINE

## 2022-07-18 SDOH — ECONOMIC STABILITY: FOOD INSECURITY: WITHIN THE PAST 12 MONTHS, YOU WORRIED THAT YOUR FOOD WOULD RUN OUT BEFORE YOU GOT MONEY TO BUY MORE.: NEVER TRUE

## 2022-07-18 SDOH — ECONOMIC STABILITY: FOOD INSECURITY: WITHIN THE PAST 12 MONTHS, THE FOOD YOU BOUGHT JUST DIDN'T LAST AND YOU DIDN'T HAVE MONEY TO GET MORE.: NEVER TRUE

## 2022-07-18 ASSESSMENT — SOCIAL DETERMINANTS OF HEALTH (SDOH): HOW HARD IS IT FOR YOU TO PAY FOR THE VERY BASICS LIKE FOOD, HOUSING, MEDICAL CARE, AND HEATING?: NOT HARD AT ALL

## 2022-07-18 NOTE — PROGRESS NOTES
[address] [facility] Internal Medicine   [facility] [address], [address]  Dept: [phone]  Dept Fax: [phone]    YOB: 1970    DM-2 and Obesity    45 y.o. female   here for follow-up of above issues. Pt is an obese  female seen by my colleague in the past, last year. I am seeing her for the first time, here with her daughter. Been a diabetic x 25 yrs , type 2 , with obesity . Hx of DM and cad in the family, had CABG x 3 followed by dr. Mi Melgar at [facility] cardiologist. No recent CP or SOB, uses a cpap due to CATHERNIE. Here for DM-2 , follows up with her PCP for medical issues. Last several A1c was 7.8  was 18.5 initially. Takes insuin,   No longer working , on insulin pump recently seen Cristy Martell our   CDE . She got a new pump , medtronic 770 . She had bone infections, and neuropathy. Had left toes all amputated and here with daughter  No low sugars, no active anginal issues. She also had cholecystectomy   Lap jennifer,  has delayed healing. She does follow up with dr. Ca Regan , podiatry  doc at [facility]. She is up to date on her annual eye exams. , she had glaucoma. Insulin pump down loaded, she is getting about 120 Units per day of insulin, and basal about 70 % , bolus 30% of the time. Current Outpatient Medications   Medication Sig Dispense Refill    JARDIANCE 25 MG tablet TAKE 1 TABLET BY MOUTH DAILY 30 tablet 10    Semaglutide, 2 MG/DOSE, (OZEMPIC, 2 MG/DOSE,) 8 MG/3ML SOPN Inject 2 mg into the skin once a week 9 mL 3    ACCU-CHEK GUIDE strip USE TO TEST BLOOD SUGAR 4 TIMES DAILY 100 strip 10    Accu-Chek FastClix Lancets MISC USE TO TEST BLOOD SUGAR 4 TIMES DAILY 102 each 10    Continuous Blood Gluc Sensor (DEXCOM G6 SENSOR) MISC Insert new sensor every 10 days.  3 each 11    Continuous Blood Gluc Transmit (DEXCOM G6 TRANSMITTER) MISC 1 each by Does not apply route every 3 months 1 each 3    insulin aspart (NOVOLOG) 100 UNIT/ML injection vial PER INSULIN PUMP ( UNITS DAILY) 15 each 5    Insulin Infusion Pump (MINIMED 770G INSULIN PUMP SYS) KIT 770G insulin pump with Guardian 3 CGM and Guardian 3 sensors 1 kit 0    glucagon 1 MG injection Inject 1 mg into the muscle See Admin Instructions Follow package directions for low blood sugar. 1 kit 3    furosemide (LASIX) 40 MG tablet Take 40 mg by mouth daily      atorvastatin (LIPITOR) 40 MG tablet Take 40 mg by mouth nightly      Handicap Placard MISC HANDICAP PLACARD MISC HANDICAP PLACARD MISC 05/08/2018 - 05/08/2018 Provider: 05- - 05-  [facility] 87 [id number])      clopidogrel (PLAVIX) 75 MG tablet Take 75 mg by mouth daily      lisinopril (PRINIVIL;ZESTRIL) 10 MG tablet Take 10 mg by mouth daily      potassium chloride (KLOR-CON) 10 MEQ extended release tablet Take 10 mEq by mouth daily  0    Ascorbic Acid (VITAMIN C) 500 MG CAPS Take 500 mg by mouth daily      fluticasone (FLONASE) 50 MCG/ACT nasal spray 1 spray by Each Nostril route as needed  1    LYRICA 150 MG capsule Take 150 mg by mouth 2 times daily. 2    venlafaxine (EFFEXOR XR) 75 MG extended release capsule Take 225 mg by mouth daily  1    cetirizine (ZYRTEC) 10 MG tablet Take 10 mg by mouth daily      metoprolol succinate (TOPROL XL) 25 MG extended release tablet Take 25 mg by mouth daily      isosorbide mononitrate (IMDUR) 30 MG extended release tablet Take 30 mg by mouth daily      nitroGLYCERIN (NITROSTAT) 0.4 MG SL tablet Place 0.4 mg under the tongue every 5 minutes as needed for Chest pain up to max of 3 total doses. If no relief after 1 dose, call 911. brimonidine-timolol (COMBIGAN) 0.2-0.5 % ophthalmic solution Place 1 drop into both eyes every 12 hours      acetaminophen (TYLENOL) 325 MG tablet Take 650 mg by mouth every 6 hours as needed for Pain      traZODone (DESYREL) 50 MG tablet Take 50 mg by mouth nightly. aspirin 81 MG tablet Take 81 mg by mouth daily.        No current facility-administered medications for this Pulse 72   Temp 97.4 °F (36.3 °C)   Wt 257 lb 3.2 oz (116.7 kg)   BMI 41.51 kg/m²      Physical Examination: General appearance - patient alert, well appearing, and in no distress but obese  Mental status - alert and oriented    Neck - supple, no significant adenopathy  Chest - clear to auscultation, no wheezes, rales or rhonchi, symmetric air entry  Heart - normal rate, regular rhythm, normal S1, S2, no murmurs, rubs, clicks or gallops  Abdomen - soft, nontender, nondistended, no masses or organomegaly  no abdominal bruits. Obese Protuberant: Yes  Neurological - alert, oriented, normal speech, no focal findings or movement disorder noted, motor and sensory grossly normal bilaterally  Musculoskeletal - no joint tenderness,   Extremities - peripheral pulses normal, no pedal edema, no clubbing or cyanosis, Thompson's sign negative bilaterally, amputation of toes of the left foot per dr. William Mitchell   Prosthesis: No  Skin - normal coloration and turgor, no rashes, no suspicious skin lesions noted      I have reviewed recent diagnostic testing including labs. Diagnosis Orders   1. Type 2 diabetes mellitus with other specified complication, with long-term current use of insulin ([facility] [address]        2. Insulin pump in place        3. Renovascular hypertension              No orders of the defined types were placed in this encounter. Outpatient Encounter Medications as of 7/18/2022   Medication Sig Dispense Refill    JARDIANCE 25 MG tablet TAKE 1 TABLET BY MOUTH DAILY 30 tablet 10    Semaglutide, 2 MG/DOSE, (OZEMPIC, 2 MG/DOSE,) 8 MG/3ML SOPN Inject 2 mg into the skin once a week 9 mL 3    ACCU-CHEK GUIDE strip USE TO TEST BLOOD SUGAR 4 TIMES DAILY 100 strip 10    Accu-Chek FastClix Lancets MISC USE TO TEST BLOOD SUGAR 4 TIMES DAILY 102 each 10    Continuous Blood Gluc Sensor (DEXCOM G6 SENSOR) MISC Insert new sensor every 10 days.  3 each 11    Continuous Blood Gluc Transmit (DEXCOM G6 TRANSMITTER) MISC 1 each by Does not apply route every 3 months 1 each 3    insulin aspart (NOVOLOG) 100 UNIT/ML injection vial PER INSULIN PUMP ( UNITS DAILY) 15 each 5    Insulin Infusion Pump (MINIMED 770G INSULIN PUMP SYS) KIT 770G insulin pump with Guardian 3 CGM and Guardian 3 sensors 1 kit 0    glucagon 1 MG injection Inject 1 mg into the muscle See Admin Instructions Follow package directions for low blood sugar. 1 kit 3    furosemide (LASIX) 40 MG tablet Take 40 mg by mouth daily      atorvastatin (LIPITOR) 40 MG tablet Take 40 mg by mouth nightly      Handicap Placard MISC HANDICAP PLACARD MISC HANDICAP PLACARD MISC 05/08/2018 - 05/08/2018 Provider: 05- - 05-  [facility] 87 ([id number])      clopidogrel (PLAVIX) 75 MG tablet Take 75 mg by mouth daily      lisinopril (PRINIVIL;ZESTRIL) 10 MG tablet Take 10 mg by mouth daily      potassium chloride (KLOR-CON) 10 MEQ extended release tablet Take 10 mEq by mouth daily  0    Ascorbic Acid (VITAMIN C) 500 MG CAPS Take 500 mg by mouth daily      fluticasone (FLONASE) 50 MCG/ACT nasal spray 1 spray by Each Nostril route as needed  1    LYRICA 150 MG capsule Take 150 mg by mouth 2 times daily. 2    venlafaxine (EFFEXOR XR) 75 MG extended release capsule Take 225 mg by mouth daily  1    cetirizine (ZYRTEC) 10 MG tablet Take 10 mg by mouth daily      metoprolol succinate (TOPROL XL) 25 MG extended release tablet Take 25 mg by mouth daily      isosorbide mononitrate (IMDUR) 30 MG extended release tablet Take 30 mg by mouth daily      nitroGLYCERIN (NITROSTAT) 0.4 MG SL tablet Place 0.4 mg under the tongue every 5 minutes as needed for Chest pain up to max of 3 total doses. If no relief after 1 dose, call 911.       brimonidine-timolol (COMBIGAN) 0.2-0.5 % ophthalmic solution Place 1 drop into both eyes every 12 hours      acetaminophen (TYLENOL) 325 MG tablet Take 650 mg by mouth every 6 hours as needed for Pain      traZODone (DESYREL) 50 MG tablet Take 50 mg by mouth nightly. aspirin 81 MG tablet Take 81 mg by mouth daily. [DISCONTINUED] insulin aspart (NOVOLOG) 100 UNIT/ML injection vial INJECT AS DIRECTED WITH INSULIN PUMP * UNITS DAILY* 50 mL 10    [DISCONTINUED] oxyCODONE-acetaminophen (PERCOCET) 5-325 MG per tablet Take 1 tablet by mouth as needed. (Patient not taking: No sig reported)      [DISCONTINUED] HYDROcodone-acetaminophen (NORCO) 5-325 MG per tablet Take 1 tablet by mouth as needed. (Patient not taking: Reported on 5/24/2022)      [DISCONTINUED] doxycycline hyclate (VIBRAMYCIN) 100 MG capsule Take 100 mg by mouth 2 times daily       No facility-administered encounter medications on file as of 7/18/2022. Diagnosis Orders   1. Type 2 diabetes mellitus with other specified complication, with long-term current use of insulin ([address]        2. Insulin pump in place        3. Renovascular hypertension              Controlled Substances Monitoring:   yes     Will schedule follow up appointment in 4 months. Plan:    Pt had CABG about 5 yrs ago, closely follow up with dr. Davis Chambers. And has amputation of several toes by dr. William Mitchell podiatrist at [facility], who has done a foot exam, . She is on   Insulin pump with jardiance, with improved  A1c overall sugars  Are well controlled, and she will follow up with Ramon Gipson in our diabetic clinic , in about a month. Strong dietary changes and regular exercise was highly recommended, along with maintaining a healthy diet and portion control was d/w patient. As she has both CAD and DM, with an elevated BMI. Signed by: Mary Ellen Ugalde M.D.     [address] [facility] Internal Medicine  [address], [place], [address]    Tel  [phone]   Fax [phone]

## 2022-08-25 ENCOUNTER — PHARMACY VISIT (OUTPATIENT)
Dept: INTERNAL MEDICINE CLINIC | Age: 52
End: 2022-08-25
Payer: MEDICARE

## 2022-08-25 DIAGNOSIS — E11.69 TYPE 2 DIABETES MELLITUS WITH OTHER SPECIFIED COMPLICATION, WITH LONG-TERM CURRENT USE OF INSULIN (HCC): Primary | ICD-10-CM

## 2022-08-25 DIAGNOSIS — Z79.4 TYPE 2 DIABETES MELLITUS WITH OTHER SPECIFIED COMPLICATION, WITH LONG-TERM CURRENT USE OF INSULIN (HCC): Primary | ICD-10-CM

## 2022-08-25 LAB — HBA1C MFR BLD: 6.8 %

## 2022-08-25 PROCEDURE — 83036 HEMOGLOBIN GLYCOSYLATED A1C: CPT | Performed by: INTERNAL MEDICINE

## 2022-08-25 PROCEDURE — G0108 DIAB MANAGE TRN  PER INDIV: HCPCS | Performed by: INTERNAL MEDICINE

## 2022-08-25 RX ORDER — DOXYCYCLINE HYCLATE 100 MG/1
100 CAPSULE ORAL 2 TIMES DAILY
COMMUNITY
Start: 2022-07-28

## 2022-08-25 NOTE — PROGRESS NOTES
[facility]  [address], [facility], [address]  [phone] (phone)  [phone] (fax)    Patient ID: Eliane Dang [id number]  Referring Provider: Dr. Brian Castellano     Patient's name and  were verified. Subjective:    She presents for Her follow-up diabetic visit. She has type 2 diabetes mellitus. Home regimen includes: Insulin, GLP-1 Agonist, and SGLT-2 inhibitors She is compliant most of the time. Assessment:     Lab Results   Component Value Date/Time    LABA1C 7.8 2022 12:00 AM    BUN 12 2022 12:15 PM    CREATININE 0.73 2022 12:15 PM     Wt Readings from Last 3 Encounters:   22 257 lb 3.2 oz (116.7 kg)   22 262 lb 3.2 oz (118.9 kg)   22 264 lb 3.2 oz (119.8 kg)     Ht Readings from Last 3 Encounters:   22 5' 6\" (1.676 m)   21 5' 5.98\" (1.676 m)   21 5' 6\" (1.676 m)       Diabetes Pharmacotherapy:  Ozempic 2mg weekly  Jardiance 25mg daily  Novolog insulin per Medtronic pump    Glucose Trends:   Glucose at 1.5 hrs PPD today resulted at 143 mg/dl  Current monitoring regimen: Dexcom CGM - checks 4+ times daily  Home blood sugar trends:    -V. High: 6%, High: 22%, Target: 74%, Low: 4%, V. Low: <1%  Any episodes of hypoglycemia? yes - 4-5x weekly    Lifestyle Factors:   Previous visit with dietician: yes - 2020  Current diet: B: skips            L: 2 egg omelet w/ cheese + sausage OR turkey sanwich OR salad + chicken/hamburger                       D: homemade - spaghetti                       Snacks: carrots, celery, apples                       Beverages:lots of water, dt coke or 7UP  Current exercise:  limited by foot in boot; arm exercises 3 days a week    Health Maintenance:  Eye exam current (within one year): yes Date: Dr. Emily Gupta, 2022  Any history of foot problems?  yes - multiple toe amputations, L foot in boot  Foot exam up to date:  yes  Date: Dr. Deshaun Kent, 2022  Immunizations up to date:    Pneumonia: no; due for Prevnar 20  Last panel - LDL:   Lab Results   Component Value Date    LDLCALC 69 11/28/2017    LDLDIRECT 64 05/17/2021   Taking ASA:  Yes   Taking statin: Yes - atorvastatin  Last microalbumin/creatinine ratio: WNL 4/2022   Taking ACE/ARB: Yes- lisinopril    Focus:   Diabetes Education. Last A1C: 6.8% today, improved from 7.8% previously. Dee's TIR is at goal at 74% and she is doing fairly well overall. Of note, Dee recently had COVID and has been battling a foot infection lately. We discussed minor pump setting adjustments to decrease early AM lows. Encouraged Dee to has greater consistency with bolusing for meals, in particular for lunch. Reviewed Dee's diet, which contains a moderate amount of CHO. Encouraged Dee to incorporate more protein into her diet. Curriculum Area Focus: Immunizations, Glucose checks/goals, and Carbohydrate counting/meal planning  DSME PLAN:       Basal Setting Change:  -12a-6a: 3.5 -> 3.35    Focus on getting a bolus for lunch! This helps to set the stage for the rest of your day  -Try to set a phone alarm    Try to get your Prevnar 20 vaccine at your local pharmacy    4. Try out drinking an Ensure Max Protein or half an Ensure Max Protein with your brunch         Goals Addressed    None          Meter download, medications, PMH and nursing assessment reviewed. Shanti Perez states She is willing to participate in this plan of care and verbalized understanding of all instructions provided. Teach back used to verify comprehension. Follow-up: 3.5 month DM Clinic RN    Total time involved in direct patient education: 30 minutes.      For [facility] in place:  Yes  Recommendation Provided To: Patient/Caregiver: 1 via In person  Intervention Detail: Dose Adjustment: 1, reason: Therapy De-escalation  Gap Closed?: No   Intervention Accepted By: Provider: 1  Time Spent (min): Jennie Bess, BCPS  Internal Medicine Clinical Pharmacist  [phone]

## 2022-08-25 NOTE — PATIENT INSTRUCTIONS
Basal Setting Change:  -12a-6a: 3.5 -> 3.35    Focus on getting a bolus for lunch! This helps to set the stage for the rest of your day  -Try to set a phone alarm    Try to get your Prevnar 20 vaccine at your local pharmacy    4.  Try out drinking an Ensure Max Protein or half an Ensure Max Protein with your brunch

## 2022-11-02 ENCOUNTER — TELEPHONE (OUTPATIENT)
Dept: INTERNAL MEDICINE CLINIC | Age: 52
End: 2022-11-02

## 2022-11-02 RX ORDER — SEMAGLUTIDE 1.34 MG/ML
INJECTION, SOLUTION SUBCUTANEOUS
Qty: 6 ML | Refills: 4 | Status: SHIPPED | OUTPATIENT
Start: 2022-11-02 | End: 2022-12-01 | Stop reason: SDUPTHER

## 2022-11-02 NOTE — TELEPHONE ENCOUNTER
Shortage of Ozempic 2mg. Per patient request, will send for 2 of the 1mg shots. If denied by insurance, will need to temporarily decrease to Ozempic 1mg or switch to Mounjaro 5mg for the duration of the shortage.      For [address] [facility] in place:  Yes  Recommendation Provided To: Patient/Caregiver: 1 via Telephone  Intervention Detail: New Rx: 1, reason: Cost/Formulary Change  Gap Closed?: No   Intervention Accepted By: Patient/Caregiver: 1  Time Spent (min): [address], PharmD, [facility]  Internal Medicine Clinical Pharmacist  [phone]

## 2022-11-03 DIAGNOSIS — E11.69 TYPE 2 DIABETES MELLITUS WITH OTHER SPECIFIED COMPLICATION, WITH LONG-TERM CURRENT USE OF INSULIN (HCC): ICD-10-CM

## 2022-11-03 DIAGNOSIS — Z79.4 TYPE 2 DIABETES MELLITUS WITH OTHER SPECIFIED COMPLICATION, WITH LONG-TERM CURRENT USE OF INSULIN (HCC): ICD-10-CM

## 2022-11-04 RX ORDER — LANCETS
EACH MISCELLANEOUS
Qty: 102 EACH | Refills: 10 | Status: SHIPPED | OUTPATIENT
Start: 2022-11-04 | End: 2022-11-11

## 2022-11-10 DIAGNOSIS — Z79.4 TYPE 2 DIABETES MELLITUS WITH OTHER SPECIFIED COMPLICATION, WITH LONG-TERM CURRENT USE OF INSULIN (HCC): ICD-10-CM

## 2022-11-10 DIAGNOSIS — E11.69 TYPE 2 DIABETES MELLITUS WITH OTHER SPECIFIED COMPLICATION, WITH LONG-TERM CURRENT USE OF INSULIN (HCC): ICD-10-CM

## 2022-11-11 RX ORDER — LANCETS
EACH MISCELLANEOUS
Qty: 102 EACH | Refills: 10 | Status: SHIPPED | OUTPATIENT
Start: 2022-11-11

## 2022-11-17 ENCOUNTER — OFFICE VISIT (OUTPATIENT)
Dept: INTERNAL MEDICINE CLINIC | Age: 52
End: 2022-11-17
Payer: MEDICARE

## 2022-11-17 VITALS
SYSTOLIC BLOOD PRESSURE: 124 MMHG | WEIGHT: 264.2 LBS | TEMPERATURE: 97.4 F | DIASTOLIC BLOOD PRESSURE: 62 MMHG | HEART RATE: 76 BPM | BODY MASS INDEX: 42.64 KG/M2

## 2022-11-17 DIAGNOSIS — E11.69 TYPE 2 DIABETES MELLITUS WITH OTHER SPECIFIED COMPLICATION, WITH LONG-TERM CURRENT USE OF INSULIN (HCC): Primary | ICD-10-CM

## 2022-11-17 DIAGNOSIS — Z96.41 INSULIN PUMP IN PLACE: ICD-10-CM

## 2022-11-17 DIAGNOSIS — E66.01 CLASS 3 SEVERE OBESITY DUE TO EXCESS CALORIES WITH SERIOUS COMORBIDITY IN ADULT, UNSPECIFIED BMI (HCC): ICD-10-CM

## 2022-11-17 DIAGNOSIS — I10 PRIMARY HYPERTENSION: ICD-10-CM

## 2022-11-17 DIAGNOSIS — Z79.4 TYPE 2 DIABETES MELLITUS WITH OTHER SPECIFIED COMPLICATION, WITH LONG-TERM CURRENT USE OF INSULIN (HCC): Primary | ICD-10-CM

## 2022-11-17 PROCEDURE — 3078F DIAST BP <80 MM HG: CPT | Performed by: INTERNAL MEDICINE

## 2022-11-17 PROCEDURE — 99213 OFFICE O/P EST LOW 20 MIN: CPT | Performed by: INTERNAL MEDICINE

## 2022-11-17 PROCEDURE — 3044F HG A1C LEVEL LT 7.0%: CPT | Performed by: INTERNAL MEDICINE

## 2022-11-17 PROCEDURE — 3074F SYST BP LT 130 MM HG: CPT | Performed by: INTERNAL MEDICINE

## 2022-11-17 RX ORDER — AMMONIUM LACTATE 12 G/100G
LOTION TOPICAL
COMMUNITY
Start: 2022-11-09

## 2022-11-17 RX ORDER — NALOXONE HYDROCHLORIDE 4 MG/.1ML
SPRAY NASAL
COMMUNITY
Start: 2022-08-17
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
## 2022-11-30 VITALS
BODY MASS INDEX: 40.58 KG/M2 | DIASTOLIC BLOOD PRESSURE: 78 MMHG | TEMPERATURE: 97.6 F | SYSTOLIC BLOOD PRESSURE: 122 MMHG | WEIGHT: 251.4 LBS | HEART RATE: 72 BPM

## 2022-12-01 DIAGNOSIS — Z79.4 TYPE 2 DIABETES MELLITUS WITH OTHER SPECIFIED COMPLICATION, WITH LONG-TERM CURRENT USE OF INSULIN (HCC): ICD-10-CM

## 2022-12-01 DIAGNOSIS — E11.69 TYPE 2 DIABETES MELLITUS WITH OTHER SPECIFIED COMPLICATION, WITH LONG-TERM CURRENT USE OF INSULIN (HCC): ICD-10-CM

## 2022-12-01 RX ORDER — SEMAGLUTIDE 1.34 MG/ML
INJECTION, SOLUTION SUBCUTANEOUS
Qty: 9 ML | Refills: 1 | Status: SHIPPED | OUTPATIENT
Start: 2022-12-01

## 2022-12-01 NOTE — TELEPHONE ENCOUNTER
Dr. David Miguel patient that really need this medication, Dr. David Miguel is not able to come in here today due to sickness.  This is Tosin Lozada (medical assistant student) supervised by Dixon Ledezma (Dr. Tena Winters)

## 2022-12-02 RX ORDER — LANCETS
EACH MISCELLANEOUS
Qty: 102 EACH | Refills: 10 | Status: SHIPPED | OUTPATIENT
Start: 2022-12-02

## 2023-02-27 RX ORDER — BLOOD-GLUCOSE TRANSMITTER
1 EACH MISCELLANEOUS
Qty: 1 EACH | Refills: 1 | Status: SHIPPED | OUTPATIENT
Start: 2023-02-27

## 2023-03-08 ENCOUNTER — TELEPHONE (OUTPATIENT)
Dept: INTERNAL MEDICINE CLINIC | Age: 53
End: 2023-03-08

## 2023-03-08 ENCOUNTER — OFFICE VISIT (OUTPATIENT)
Dept: INTERNAL MEDICINE CLINIC | Age: 53
End: 2023-03-08
Payer: MEDICARE

## 2023-03-08 VITALS
DIASTOLIC BLOOD PRESSURE: 68 MMHG | BODY MASS INDEX: 42.07 KG/M2 | SYSTOLIC BLOOD PRESSURE: 135 MMHG | HEART RATE: 67 BPM | WEIGHT: 261.8 LBS | HEIGHT: 66 IN | TEMPERATURE: 97.7 F

## 2023-03-08 DIAGNOSIS — Z79.4 TYPE 2 DIABETES MELLITUS WITH OTHER SPECIFIED COMPLICATION, WITH LONG-TERM CURRENT USE OF INSULIN (HCC): Primary | ICD-10-CM

## 2023-03-08 DIAGNOSIS — E11.69 TYPE 2 DIABETES MELLITUS WITH OTHER SPECIFIED COMPLICATION, WITH LONG-TERM CURRENT USE OF INSULIN (HCC): Primary | ICD-10-CM

## 2023-03-08 PROCEDURE — G0108 DIAB MANAGE TRN  PER INDIV: HCPCS | Performed by: INTERNAL MEDICINE

## 2023-03-08 RX ORDER — LOSARTAN POTASSIUM 50 MG/1
50 TABLET ORAL DAILY
COMMUNITY
Start: 2023-01-30

## 2023-03-08 RX ORDER — SEMAGLUTIDE 2.68 MG/ML
2 INJECTION, SOLUTION SUBCUTANEOUS WEEKLY
COMMUNITY
Start: 2023-02-28

## 2023-03-08 NOTE — PROGRESS NOTES
[facility]  [facility]. [address], [facility], [address]  [phone] (phone)  [phone] (fax)    Patient ID: Woodrow Tellez 1970  Referring Provider: Dr. Gustavo Mayer     Patient's name and  were verified. Subjective:    She presents for a follow-up diabetic visit. She has type 2 diabetes mellitus. Home regimen includes: Insulin, GLP-1 Agonist, and SGLT-2 inhibitors She is compliant some of the time. Assessment:     Lab Results   Component Value Date/Time    LABA1C 6.8 2022 04:34 PM    BUN 12 2022 12:15 PM    CREATININE 0.73 2022 12:15 PM     Vitals:    23 1302   BP: 135/68   Site: Right Upper Arm   Position: Sitting   Pulse: 67   Temp: 97.7 °F (36.5 °C)   Weight: 261 lb 12.8 oz (118.8 kg)   Height: 5' 6\" (1.676 m)     Wt Readings from Last 3 Encounters:   23 261 lb 12.8 oz (118.8 kg)   22 264 lb 3.2 oz (119.8 kg)   22 251 lb 6.4 oz (114 kg)     Ht Readings from Last 3 Encounters:   23 5' 6\" (1.676 m)   22 5' 6\" (1.676 m)   21 5' 5.98\" (1.676 m)       Diabetes Pharmacotherapy:  Novolog insulin per Medtronic 770G pump  Ozempic 2mg weekly, Sundays  Jardiance 25mg daily    Glucose Trends:   Glucose at 2 hrs PPD today resulted at 115mg/dl  Current monitoring regimen: Dexcom CGM - checks 4+ times daily  Home blood sugar trends:    -V. High: 9%, High: 28%, Target: 61%, Low: 1%, V. Low: 1%   -Average Glucose: 166 mg/dL; GMI: 7.3%   -glucose levels stable through the night; breakfast clearance appears adequate but rise after lunhc and remain elevated until evening. Any episodes of hypoglycemia?  yes - 1-2 per week; timing random with middle of the night; 3pm    -Treats with 3 large smarties    Lifestyle Factors:   Previous visit with dietician: yes - 2020  Current diet: B: 8-9am 2 eggs/ toast/ coffee-cream 35gms            L:  1pm salad/ chicken                       D:  6:30pm pork chops ( 2 small); m potato                       Snacks: carrots, celery, grapes, apples, oranges                       Beverages: water; diet coke 1-2 per day; coffee w/ creamer; diet ocean spray  Current exercise: ADL's -moderate activity                     Dumbbells and stretches    Health Maintenance:  Diabetes Management   Topic Date Due    Diabetic retinal exam  11/05/2022    Lipids  12/13/2022       Eye exam current (within one year): yes Date: 2/2023, Dr. Bill Loaiza. Dr. Vickie Mora 7/2022  Any history of foot problems? yes; amputation of all 10 toes in the past 4 years  Foot exam up to date: yes Date: 2/2023, Dr. Jessenia Quinn --stable  Immunizations up to date:    Pneumonia (dose of Prevnar 20 OR Pneumovax 23 + Prevnar 13): no   Influenza: yes  The ASCVD Risk score (Hipolito AHUMADA, et al., 2019) failed to calculate for the following reasons: The valid total cholesterol range is 130 to 320 mg/dL   Last panel - LDL:   Lab Results   Component Value Date    LDLCALC 69 11/28/2017    LDLDIRECT 64 05/17/2021     Taking ASA:  Yes   Taking statin: Yes - atorvastatin  Last microalbumin/creatinine ratio:   Microalbumin Creatinine Ratio   Date Value Ref Range Status   03/03/2017 3.3 0 - 30.0 mg/Gm Final      Taking ACE/ARB: yes, losartan  Depression screening completed. 5/24/2022    Focus:   Diabetes Education. Last A1C: 7.1% 3/8/2022. Overall, Dee is doing better. She is not wearing a boot and she currently reports having no sores, but she has lost all 10 toes in the last 4 years. Stressed the risk of further amputations without close monitoring and good glucose control. Dee is making dietary efforts and beginning exercise efforts. Discussed optimizing these goals. She is snacking on vegetables and fruits, but not bolusing for the fruits. This can explain the higher readings through the  afternoon/ evening. She agrees to bolus for carbs eaten. Her insulin pump settings are at maximum for making adjustments in her settings. She could benefit from concentrated insulin in her pump.  Will review with Dr. Rama Cope. Curriculum Area Focus: Diabetes complications, Foot care, Carbohydrate counting/meal planning, Physical activity goals, Hypoglycemia management, and Lifestyle & healthy coping  DSME PLAN:     Great job with setting alarms: for blood sugar checks, meals and               Boluses  Limit fruits for snacks to 1 serving = make sure you bolus for those                    Carbohydrate snacks  Try to bolus for breakfast when you start preparing it so it will cover                  Your breakfast better  Meal clearance (doing good): before meal blood sugar                                      Within 1 -1.5 hours blood sugar under 200                                     2 hours after eating blood sugar under 150/160  Exercise every day (6 of 7 days)  When you get your Glucagon renewed                 --check about GVoke or [place]egalogue or Basqimi (powder)             -10 minutes of chair aerobics Marenoch Ros or your favorite song)                          *make sure you get your heart rate up            And your stretching exercises! 20 minutes total each ay would be great     Goals Addressed                   This Visit's Progress     3 scheduled meals. limit snacks to 1 between meals   On track     Reduce fat intake to X grams per day   Not on track     stretch band exercise 10-20 minutes; 3 times per day   Not on track           Meter download, medications, PMH and nursing assessment reviewed. [facility] states She is willing to participate in this plan of care and verbalized understanding of all instructions provided. Teach back used to verify comprehension. Follow-up: 3 months    Total time involved in direct patient education: 60 minutes.    Individual Education appointment justified due to: Need for Additional Insulin Training

## 2023-03-08 NOTE — TELEPHONE ENCOUNTER
Diabetes Pharmacotherapy:  Novolog insulin per Medtronic 770G pump  Ozempic 2mg weekly, Sundays  Jardiance 25mg daily     Glucose Trends:   Glucose at 2 hrs PPD today resulted at 115mg/dl  Current monitoring regimen: Dexcom CGM - checks 4+ times daily  Home blood sugar trends:               -V. High: 9%, High: 28%, Target: 61%, Low: 1%, V. Low: 1%              -Average Glucose: 166 mg/dL; GMI: 7.3%              -glucose levels stable through the night; breakfast clearance appears adequate but rise after lunhc and remain elevated until evening. With her high volumes of insulin, Dee's pump settings are close to maximum; leaving little to no room for adjustments as needed and she is having to change her pump site every 1-2 days. She could benefit from concentrated insulin to help keep her blood sugars in goal. Will review with Dr. Mal Arboleda. Dr. Mal Arboleda,  Please consider the attached script for Humalog U200 insulin. If you agree, please sign. I am good Rick Kapoor with above plan, script signed today .      Electronically signed by Renae Arndt MD on 3/21/2023 at 8:58 PM

## 2023-04-27 DIAGNOSIS — E11.69 TYPE 2 DIABETES MELLITUS WITH OTHER SPECIFIED COMPLICATION, WITH LONG-TERM CURRENT USE OF INSULIN (HCC): Primary | ICD-10-CM

## 2023-04-27 DIAGNOSIS — Z79.4 TYPE 2 DIABETES MELLITUS WITH OTHER SPECIFIED COMPLICATION, WITH LONG-TERM CURRENT USE OF INSULIN (HCC): Primary | ICD-10-CM

## 2023-04-27 RX ORDER — BLOOD SUGAR DIAGNOSTIC
STRIP MISCELLANEOUS
Qty: 100 STRIP | Refills: 10 | Status: SHIPPED | OUTPATIENT
Start: 2023-04-27 | End: 2023-04-27 | Stop reason: CLARIF

## 2023-04-27 RX ORDER — BLOOD SUGAR DIAGNOSTIC
STRIP MISCELLANEOUS
Qty: 100 STRIP | Refills: 10 | Status: SHIPPED | OUTPATIENT
Start: 2023-04-27

## 2023-05-05 LAB
AVERAGE GLUCOSE: NORMAL
CREATININE, URINE: NORMAL
HBA1C MFR BLD: 7 %
MICROALBUMIN/CREAT 24H UR: 10 MG/G{CREAT}
MICROALBUMIN/CREAT UR-RTO: <30

## 2023-05-10 RX ORDER — PROCHLORPERAZINE 25 MG/1
SUPPOSITORY RECTAL
Qty: 3 EACH | Refills: 11 | Status: SHIPPED | OUTPATIENT
Start: 2023-05-10

## 2023-05-10 NOTE — TELEPHONE ENCOUNTER
Patient of Dr. Michaela Scott    Last ordered 4/20/22, disp 3, refill 11    Date of last visit 11/17/22  Date of next visit 6/29

## 2023-05-22 DIAGNOSIS — Z79.4 TYPE 2 DIABETES MELLITUS WITH OTHER SPECIFIED COMPLICATION, WITH LONG-TERM CURRENT USE OF INSULIN (HCC): ICD-10-CM

## 2023-05-22 DIAGNOSIS — E11.69 TYPE 2 DIABETES MELLITUS WITH OTHER SPECIFIED COMPLICATION, WITH LONG-TERM CURRENT USE OF INSULIN (HCC): ICD-10-CM

## 2023-08-10 DIAGNOSIS — Z79.4 TYPE 2 DIABETES MELLITUS WITH OTHER SPECIFIED COMPLICATION, WITH LONG-TERM CURRENT USE OF INSULIN (HCC): ICD-10-CM

## 2023-08-10 DIAGNOSIS — E11.69 TYPE 2 DIABETES MELLITUS WITH OTHER SPECIFIED COMPLICATION, WITH LONG-TERM CURRENT USE OF INSULIN (HCC): ICD-10-CM

## 2023-08-21 RX ORDER — PROCHLORPERAZINE 25 MG/1
1 SUPPOSITORY RECTAL
Qty: 1 EACH | Refills: 1 | Status: SHIPPED | OUTPATIENT
Start: 2023-08-21

## 2023-09-08 RX ORDER — LANCETS
EACH MISCELLANEOUS
Qty: 102 EACH | Refills: 10 | Status: SHIPPED | OUTPATIENT
Start: 2023-09-08

## 2023-09-21 ENCOUNTER — OFFICE VISIT (OUTPATIENT)
Dept: INTERNAL MEDICINE CLINIC | Age: 53
End: 2023-09-21
Payer: MEDICARE

## 2023-09-21 DIAGNOSIS — E11.42 DIABETIC PERIPHERAL NEUROPATHY ASSOCIATED WITH TYPE 2 DIABETES MELLITUS (HCC): Primary | ICD-10-CM

## 2023-09-21 PROCEDURE — 99999 PR OFFICE/OUTPT VISIT,PROCEDURE ONLY: CPT | Performed by: INTERNAL MEDICINE

## 2023-09-21 PROCEDURE — G0108 DIAB MANAGE TRN  PER INDIV: HCPCS | Performed by: INTERNAL MEDICINE

## 2023-09-21 RX ORDER — SULFAMETHOXAZOLE AND TRIMETHOPRIM 800; 160 MG/1; MG/1
1 TABLET ORAL 2 TIMES DAILY
COMMUNITY
Start: 2023-09-18 | End: 2023-10-19 | Stop reason: ALTCHOICE

## 2023-09-21 NOTE — PATIENT INSTRUCTIONS
Try to eat 2 meals per day - even if you aren't very hungry  -Consider a high protein, low carb shake/drink    2. Pump Setting Adjustments:    Basal  12a: 3.35 -> 3.2 (to limit overnight lows)  6a: 3.3   9a: 3.35  4p: 3.9    Carb Ratio:  12a: 1.6  10a: 1.6 -> 1.4  4p: 1    3. Try to more accurately estimate your breakfast bolus    4. When you are ready to upgrade to the Medtronic 780G/Guardian 4 system, go to: [email or web address] shop   -Create an account to order the product   -Let us know if you need assistance with the upgrade

## 2023-09-21 NOTE — PROGRESS NOTES
Medtronic 780G/Guardian 4 system, go to: [email or web address] shop   -Create an account to order the product   -Let us know if you need assistance with the upgrade       Goals Addressed    None          Meter download, medications, PMH and nursing assessment reviewed. Bandar Andrea states She is willing to participate in this plan of care and verbalized understanding of all instructions provided. Teach back used to verify comprehension. Follow-up: 2 month Dr. Juan C Hoffmann, 3 month DM Clinic RN    Total time involved in direct patient education: 30 minutes.    Individual Education appointment justified due to: Class Availability    For Pharmacy Admin Tracking Only    Program: Medical Group  CPA in place:  Yes  Recommendation Provided To: Patient/Caregiver: 1 via In person  Intervention Detail: Dose Adjustment: 1, reason: Therapy Optimization  Intervention Accepted By: Patient/Caregiver: 1  Gap Closed?: No   Time Spent (min): 2 [facility], Pharm[facility]ATH, [address] [place] [address]  Internal Medicine Clinical Pharmacist  [phone]

## 2023-10-06 NOTE — TELEPHONE ENCOUNTER
Patient asked pharmacy to send request for these medications. This was a Dr. Che Moore pt now it looks like she see's  Dr. Mikey Alexander.

## 2023-10-09 NOTE — TELEPHONE ENCOUNTER
Dee is asking to have these scripts sent to [address] [place] [address] with  new insurance; 90 days supply. Please address pended scripts. [Mother] : mother [Formula ___ oz/feed] : [unfilled] oz of formula per feed [Hours between feeds ___] : Child is fed every [unfilled] hours [Fruit] : fruit [Vegetables] : vegetables [Fish] : fish [Cereal] : cereal [___ stools per day] : [unfilled]  stools per day [Tarry] : stools are tarry color [Firm] : firm consistency [___ voids per day] : [unfilled] voids per day [Normal] : Normal [On back] : On back [In crib] : In crib [Bottle in bed] : Bottle in bed [Tap water] : Primary Fluoride Source: Tap water [No] : Not at  exposure [Water heater temperature set at <120 degrees F] : Water heater temperature set at <120 degrees F [Rear facing car seat in  back seat] : Rear facing car seat in  back seat [Carbon Monoxide Detectors] : Carbon monoxide detectors [Smoke Detectors] : Smoke detectors [Gun in Home] : No gun in home [Exposure to electronic nicotine delivery system] : No exposure to electronic nicotine delivery system [Infant walker] : No infant walker [FreeTextEntry7] : evaluated by [facility] for PT - did not see any UE problems, will contact D&B for clarification [de-identified] : Switched to enfamil and tolerating [de-identified] : 1 tooth erupting [FreeTextEntry1] : 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

## 2023-10-11 RX ORDER — SEMAGLUTIDE 2.68 MG/ML
2 INJECTION, SOLUTION SUBCUTANEOUS WEEKLY
Qty: 9 ML | Refills: 3 | Status: SHIPPED | OUTPATIENT
Start: 2023-10-11

## 2023-10-11 RX ORDER — INSULIN ASPART 100 [IU]/ML
INJECTION, SOLUTION INTRAVENOUS; SUBCUTANEOUS
Qty: 150 ML | Refills: 3 | Status: SHIPPED | OUTPATIENT
Start: 2023-10-11

## 2023-10-12 ENCOUNTER — TELEPHONE (OUTPATIENT)
Dept: INFECTIOUS DISEASES | Age: 53
End: 2023-10-12

## 2023-10-12 NOTE — TELEPHONE ENCOUNTER
Called patient to schedule appointment for [facility] clinic per referral.  Appointment, Monday, October 23 @ 11:15.

## 2023-10-19 ENCOUNTER — TELEPHONE (OUTPATIENT)
Dept: INFECTIOUS DISEASES | Age: 53
End: 2023-10-19

## 2023-10-23 ENCOUNTER — OFFICE VISIT (OUTPATIENT)
Dept: INFECTIOUS DISEASES | Age: 53
End: 2023-10-23
Payer: MEDICARE

## 2023-10-23 VITALS
WEIGHT: 260 LBS | BODY MASS INDEX: 41.78 KG/M2 | TEMPERATURE: 97.6 F | HEART RATE: 73 BPM | SYSTOLIC BLOOD PRESSURE: 114 MMHG | DIASTOLIC BLOOD PRESSURE: 72 MMHG | RESPIRATION RATE: 18 BRPM | OXYGEN SATURATION: 97 % | HEIGHT: 66 IN

## 2023-10-23 DIAGNOSIS — L97.524 DIABETIC ULCER OF TOE OF LEFT FOOT ASSOCIATED WITH DIABETES MELLITUS DUE TO UNDERLYING CONDITION, WITH NECROSIS OF BONE (HCC): Primary | ICD-10-CM

## 2023-10-23 DIAGNOSIS — E08.621 DIABETIC ULCER OF TOE OF LEFT FOOT ASSOCIATED WITH DIABETES MELLITUS DUE TO UNDERLYING CONDITION, WITH NECROSIS OF BONE (HCC): Primary | ICD-10-CM

## 2023-10-23 DIAGNOSIS — M86.8X7 OTHER OSTEOMYELITIS OF LEFT FOOT (HCC): ICD-10-CM

## 2023-10-23 DIAGNOSIS — A49.01 MSSA (METHICILLIN SUSCEPTIBLE STAPHYLOCOCCUS AUREUS) INFECTION: ICD-10-CM

## 2023-10-23 PROCEDURE — 1036F TOBACCO NON-USER: CPT | Performed by: NURSE PRACTITIONER

## 2023-10-23 PROCEDURE — 3078F DIAST BP <80 MM HG: CPT | Performed by: NURSE PRACTITIONER

## 2023-10-23 PROCEDURE — 3074F SYST BP LT 130 MM HG: CPT | Performed by: NURSE PRACTITIONER

## 2023-10-23 PROCEDURE — 3017F COLORECTAL CA SCREEN DOC REV: CPT | Performed by: NURSE PRACTITIONER

## 2023-10-23 PROCEDURE — G8427 DOCREV CUR MEDS BY ELIG CLIN: HCPCS | Performed by: NURSE PRACTITIONER

## 2023-10-23 PROCEDURE — G8484 FLU IMMUNIZE NO ADMIN: HCPCS | Performed by: NURSE PRACTITIONER

## 2023-10-23 PROCEDURE — 99204 OFFICE O/P NEW MOD 45 MIN: CPT | Performed by: NURSE PRACTITIONER

## 2023-10-23 PROCEDURE — G8417 CALC BMI ABV UP PARAM F/U: HCPCS | Performed by: NURSE PRACTITIONER

## 2023-10-23 NOTE — PATIENT INSTRUCTIONS
Visit Discharge/Physician Orders:    Plan to start IV antibiotics. Referral will be placed to [facility] for PICC line care    Labs to be drawn weekly while on antibiotics    Keep next scheduled appointment. Please give 24 hour notice if unable to keep appointment. [phone]    If you experience any new onset or worsening symptoms, please call the Infectious Disease Clinic at [phone]. This line is monitored twice weekly. If you need medical attention outside of business hours, please contact your Primary Care Doctor or go to the nearest emergency room.
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
## 2023-10-24 PROBLEM — A49.01 MSSA (METHICILLIN SUSCEPTIBLE STAPHYLOCOCCUS AUREUS) INFECTION: Status: ACTIVE | Noted: 2023-10-24

## 2023-10-24 PROBLEM — L97.509 ULCER OF FOOT DUE TO TYPE 2 DIABETES MELLITUS (HCC): Status: ACTIVE | Noted: 2023-10-24

## 2023-10-24 PROBLEM — L97.524 NON-PRESSURE CHRONIC ULCER OF OTHER PART OF LEFT FOOT WITH NECROSIS OF BONE (HCC): Status: ACTIVE | Noted: 2023-10-24

## 2023-10-24 PROBLEM — M86.9 OSTEOMYELITIS OF LEFT FOOT (HCC): Status: ACTIVE | Noted: 2023-10-24

## 2023-10-24 PROBLEM — E11.621 ULCER OF FOOT DUE TO TYPE 2 DIABETES MELLITUS (HCC): Status: ACTIVE | Noted: 2023-10-24

## 2023-10-24 PROBLEM — E11.42 DIABETIC PERIPHERAL NEUROPATHY ASSOCIATED WITH TYPE 2 DIABETES MELLITUS (HCC): Status: ACTIVE | Noted: 2023-10-24

## 2023-10-30 VITALS
WEIGHT: 258.6 LBS | BODY MASS INDEX: 41.74 KG/M2 | TEMPERATURE: 97.9 F | SYSTOLIC BLOOD PRESSURE: 130 MMHG | DIASTOLIC BLOOD PRESSURE: 80 MMHG | HEART RATE: 83 BPM

## 2023-11-01 ENCOUNTER — TELEPHONE (OUTPATIENT)
Dept: INTERNAL MEDICINE CLINIC | Age: 53
End: 2023-11-01

## 2023-11-01 NOTE — TELEPHONE ENCOUNTER
Called patient to see if she could bring in her DexCom to be downloaded. Patient stated that she has a pic line at this time due to having an infection in her foot and that she has been having a little higher blood sugars due to the infection but feels she is doing ok with them. She is unable to bring it in today but will try to bring it in tomorrow, 11/2/2023.

## 2023-11-09 ENCOUNTER — TELEPHONE (OUTPATIENT)
Dept: INFECTIOUS DISEASES | Age: 53
End: 2023-11-09

## 2023-11-09 NOTE — TELEPHONE ENCOUNTER
Appointment reminder call, ask patient if she had her blood work completed each week as ordered. Patient, Kory Hull, states home health came and mahesh it the last two weeks. Patient states it was [facility] that she used. Reminded patient on her appointment this coming Monday at 10:00 am.      Called [facility] and asked that they put down for nurses to fax blood work results to Infectious Disease Clinic, [facility]s and gave them the number. I then asked for them to fax the last two to the clinic today.

## 2023-11-13 ENCOUNTER — OFFICE VISIT (OUTPATIENT)
Dept: INFECTIOUS DISEASES | Age: 53
End: 2023-11-13
Payer: MEDICARE

## 2023-11-13 VITALS
WEIGHT: 253 LBS | OXYGEN SATURATION: 98 % | BODY MASS INDEX: 40.66 KG/M2 | HEIGHT: 66 IN | SYSTOLIC BLOOD PRESSURE: 124 MMHG | HEART RATE: 94 BPM | TEMPERATURE: 98 F | RESPIRATION RATE: 20 BRPM | DIASTOLIC BLOOD PRESSURE: 84 MMHG

## 2023-11-13 DIAGNOSIS — A49.01 MSSA (METHICILLIN SUSCEPTIBLE STAPHYLOCOCCUS AUREUS) INFECTION: ICD-10-CM

## 2023-11-13 DIAGNOSIS — Z79.2 RECEIVING INTRAVENOUS ANTIBIOTIC TREATMENT AT HOME: ICD-10-CM

## 2023-11-13 DIAGNOSIS — L97.524 NON-PRESSURE CHRONIC ULCER OF OTHER PART OF LEFT FOOT WITH NECROSIS OF BONE (HCC): ICD-10-CM

## 2023-11-13 DIAGNOSIS — M86.072 ACUTE HEMATOGENOUS OSTEOMYELITIS OF LEFT FOOT (HCC): Primary | ICD-10-CM

## 2023-11-13 PROCEDURE — G8417 CALC BMI ABV UP PARAM F/U: HCPCS | Performed by: NURSE PRACTITIONER

## 2023-11-13 PROCEDURE — 99214 OFFICE O/P EST MOD 30 MIN: CPT | Performed by: NURSE PRACTITIONER

## 2023-11-13 PROCEDURE — 1036F TOBACCO NON-USER: CPT | Performed by: NURSE PRACTITIONER

## 2023-11-13 PROCEDURE — 3017F COLORECTAL CA SCREEN DOC REV: CPT | Performed by: NURSE PRACTITIONER

## 2023-11-13 PROCEDURE — 3074F SYST BP LT 130 MM HG: CPT | Performed by: NURSE PRACTITIONER

## 2023-11-13 PROCEDURE — G8484 FLU IMMUNIZE NO ADMIN: HCPCS | Performed by: NURSE PRACTITIONER

## 2023-11-13 PROCEDURE — 3079F DIAST BP 80-89 MM HG: CPT | Performed by: NURSE PRACTITIONER

## 2023-11-13 PROCEDURE — G8427 DOCREV CUR MEDS BY ELIG CLIN: HCPCS | Performed by: NURSE PRACTITIONER

## 2023-11-13 NOTE — PATIENT INSTRUCTIONS
Visit Discharge/Physician Orders:    Continue Ancef 3 times daily     Yogurt or probiotic daily while on antibiotic. Drink plenty of water    Labs: have drawn weekly    Keep next scheduled appointment. Please give 24 hour notice if unable to keep appointment. [phone]    If you experience any new onset or worsening symptoms, please call the Infectious Disease Clinic at [phone]. This line is monitored twice weekly. If you need medical attention outside of business hours, please contact your Primary Care Doctor or go to the nearest emergency room.

## 2023-11-13 NOTE — PROGRESS NOTES
signs/symptoms of worsening infection. Advised patient to call clinic or seek emergency care should these occur. Follow up: 3 weeks for re-evaluation. Call clinic with any needs or concerns prior to scheduled visit. Plan of care as above reviewed with patient who verbalizes understanding. All questions and concerns addressed prior to completion of visit. Please see attached Discharge Instructions    Written patient dismissal instructions given to patient and signed by patient or POA. Case was reviewed with Dr. Yefri Parker who agrees with plan of care as outlined.       Electronically signed by EM Pitts CNP on 11/13/2023 at 10:49 AM

## 2023-11-20 ENCOUNTER — TELEPHONE (OUTPATIENT)
Dept: INFECTIOUS DISEASES | Age: 53
End: 2023-11-20

## 2023-11-20 NOTE — TELEPHONE ENCOUNTER
Called patient, Amanda Barnes, to inform her that her labs are improving and that she is to continue her treatment plan. Patient voiced understanding.

## 2023-11-20 NOTE — TELEPHONE ENCOUNTER
----- Message from [address] [place] [address], EM - CNP sent at 11/17/2023  1:08 PM EST -----  Regarding: labs  Please let patient know her last labs are improving. Continue current treatment plan. Thank you!     Amy Sloan

## 2023-11-21 ENCOUNTER — OFFICE VISIT (OUTPATIENT)
Dept: INTERNAL MEDICINE CLINIC | Age: 53
End: 2023-11-21
Payer: MEDICARE

## 2023-11-21 VITALS
RESPIRATION RATE: 18 BRPM | WEIGHT: 252.6 LBS | SYSTOLIC BLOOD PRESSURE: 120 MMHG | OXYGEN SATURATION: 97 % | DIASTOLIC BLOOD PRESSURE: 70 MMHG | TEMPERATURE: 98.3 F | BODY MASS INDEX: 40.77 KG/M2 | HEART RATE: 80 BPM

## 2023-11-21 DIAGNOSIS — E11.69 TYPE 2 DIABETES MELLITUS WITH OTHER SPECIFIED COMPLICATION, WITH LONG-TERM CURRENT USE OF INSULIN (HCC): ICD-10-CM

## 2023-11-21 DIAGNOSIS — Z79.4 TYPE 2 DIABETES MELLITUS WITH OTHER SPECIFIED COMPLICATION, WITH LONG-TERM CURRENT USE OF INSULIN (HCC): ICD-10-CM

## 2023-11-21 DIAGNOSIS — E11.42 DIABETIC PERIPHERAL NEUROPATHY ASSOCIATED WITH TYPE 2 DIABETES MELLITUS (HCC): Primary | ICD-10-CM

## 2023-11-21 DIAGNOSIS — E66.01 CLASS 2 SEVERE OBESITY DUE TO EXCESS CALORIES WITH SERIOUS COMORBIDITY IN ADULT, UNSPECIFIED BMI (HCC): ICD-10-CM

## 2023-11-21 DIAGNOSIS — E66.01 CLASS 3 SEVERE OBESITY DUE TO EXCESS CALORIES WITH SERIOUS COMORBIDITY IN ADULT, UNSPECIFIED BMI (HCC): ICD-10-CM

## 2023-11-21 LAB — HBA1C MFR BLD: 6.6 % (ref 4.3–5.7)

## 2023-11-21 PROCEDURE — 1036F TOBACCO NON-USER: CPT | Performed by: INTERNAL MEDICINE

## 2023-11-21 PROCEDURE — 3044F HG A1C LEVEL LT 7.0%: CPT | Performed by: INTERNAL MEDICINE

## 2023-11-21 PROCEDURE — 3078F DIAST BP <80 MM HG: CPT | Performed by: INTERNAL MEDICINE

## 2023-11-21 PROCEDURE — 3074F SYST BP LT 130 MM HG: CPT | Performed by: INTERNAL MEDICINE

## 2023-11-21 PROCEDURE — 99214 OFFICE O/P EST MOD 30 MIN: CPT | Performed by: INTERNAL MEDICINE

## 2023-11-21 PROCEDURE — 2022F DILAT RTA XM EVC RTNOPTHY: CPT | Performed by: INTERNAL MEDICINE

## 2023-11-21 PROCEDURE — G8427 DOCREV CUR MEDS BY ELIG CLIN: HCPCS | Performed by: INTERNAL MEDICINE

## 2023-11-21 PROCEDURE — G8484 FLU IMMUNIZE NO ADMIN: HCPCS | Performed by: INTERNAL MEDICINE

## 2023-11-21 PROCEDURE — G8417 CALC BMI ABV UP PARAM F/U: HCPCS | Performed by: INTERNAL MEDICINE

## 2023-11-21 PROCEDURE — 3017F COLORECTAL CA SCREEN DOC REV: CPT | Performed by: INTERNAL MEDICINE

## 2023-11-21 NOTE — PROGRESS NOTES
normal S1, S2, no murmurs, rubs, clicks or gallops  Abdomen - soft, nontender, nondistended, no masses or organomegaly  no abdominal bruits. Obese Protuberant: Yes  Neurological - alert, oriented, normal speech, no focal findings or movement disorder noted, motor and sensory grossly normal bilaterally  Musculoskeletal - no joint tenderness,   Extremities - peripheral pulses normal, no pedal edema, no clubbing or cyanosis, Thompson's sign negative bilaterally, amputation of toes BILATERALLY ,  per dr. Kathryn Peña . She does have a special boot of the LLE. Prosthesis: No  Skin - normal coloration and turgor, no rashes, no suspicious skin lesions noted      I have reviewed recent diagnostic testing including labs. Diagnosis Orders   1. Diabetic peripheral neuropathy associated with type 2 diabetes mellitus ([address])        2. Type 2 diabetes mellitus with other specified complication, with long-term current use of insulin ([facility])  POCT glycosylated hemoglobin (Hb A1C)      3. Class 2 severe obesity due to excess calories with serious comorbidity in adult, unspecified BMI ([facility])        4. Class 3 severe obesity due to excess calories with serious comorbidity in adult, unspecified BMI ([address])                Orders Placed This Encounter   Procedures    POCT glycosylated hemoglobin (Hb A1C)         Outpatient Encounter Medications as of 11/21/2023   Medication Sig Dispense Refill    ceFAZolin (ANCEF) infusion Infuse 2,000 mg intravenously in the morning and 2,000 mg at noon and 2,000 mg in the evening. Compound per protocol. 252 g 0    OZEMPIC, 2 MG/DOSE, 8 MG/3ML SOPN Inject 2 mg as directed once a week 9 mL 3    empagliflozin (JARDIANCE) 25 MG tablet Take 1 tablet by mouth daily 90 tablet 1    insulin aspart (NOVOLOG) 100 UNIT/ML injection vial For use in Medtronic insulin pump.   units per day 150 mL 3    Accu-Chek FastClix Lancets MISC USE AS DIRECTED FOUR TIMES A  each 10    Continuous Blood Gluc Transmit (DEXCOM

## 2023-11-28 ENCOUNTER — TELEPHONE (OUTPATIENT)
Dept: INFECTIOUS DISEASES | Age: 53
End: 2023-11-28

## 2023-11-28 NOTE — TELEPHONE ENCOUNTER
Called home health to discuss below message. They stated that the nurse, Roselyn Kay, was not in and that they were unable to clarify if the patient was still able to infuse the medications,  if they were able to obtain her blood work yesterday, and amount of drainage as noted. Called patient who stated that she is still able to infuse medication as per normal, states that it is sluggish only while drawing labs. She stated that the drainage reported is very small in nature, noted on back of bio-patch when changing dressing. She denies any erythema, edema, warmth to the touch, skin irritation or pain to her arm that PICC line is inserted in. She states that home health nurse told her she would have to go to ER for cathflo. Patient is scheduled to have PICC line removed on Monday. PICC line is still reported to be infusing normally. Advised her that, should she not be able to infuse via her PICC line, then Cathflo is necessary. Since she is still able to infuse her medication, recommend continuing to monitor only as the plan is to discontinue PICC line in less than a week. Patient denies any indications of infection or clot to arm indicating need for ER evaluation. Drainage reported as small in nature, does not go through entire bio-patch in 7 days time. Patient was advised to call or seek emergency care with any new or worsening symptoms. Patient voiced understanding and agreement to plan.

## 2023-11-28 NOTE — TELEPHONE ENCOUNTER
----- Message from Nicole Marquez LPN sent at 84/42/9495 12:07 PM EST -----  [facility], called on 11/27, and stated that the picc., blue port  is  sluggish and the red port has no return along with a small amt., of greenish drainage on disc., around the red port. She has no other symptoms at this time. She will finish her antibiotics on the fourth of Dec..  [address] [place] [address],[address] asked for you to send them a order if you want the picc pulled after she is done with antibiotic on that day.     Thank you,  Sylvia Mullen                                   [address] [place] [address],[address] # [phone]

## 2023-12-04 ENCOUNTER — OFFICE VISIT (OUTPATIENT)
Dept: INFECTIOUS DISEASES | Age: 53
End: 2023-12-04
Payer: MEDICARE

## 2023-12-04 VITALS
HEART RATE: 86 BPM | RESPIRATION RATE: 18 BRPM | WEIGHT: 267 LBS | OXYGEN SATURATION: 97 % | BODY MASS INDEX: 42.91 KG/M2 | SYSTOLIC BLOOD PRESSURE: 125 MMHG | DIASTOLIC BLOOD PRESSURE: 68 MMHG | TEMPERATURE: 97.8 F | HEIGHT: 66 IN

## 2023-12-04 DIAGNOSIS — Z79.2 RECEIVING INTRAVENOUS ANTIBIOTIC TREATMENT AT HOME: ICD-10-CM

## 2023-12-04 DIAGNOSIS — L97.524 DIABETIC ULCER OF TOE OF LEFT FOOT ASSOCIATED WITH DIABETES MELLITUS DUE TO UNDERLYING CONDITION, WITH NECROSIS OF BONE (HCC): ICD-10-CM

## 2023-12-04 DIAGNOSIS — M86.8X7 OTHER OSTEOMYELITIS OF LEFT FOOT (HCC): ICD-10-CM

## 2023-12-04 DIAGNOSIS — E08.621 DIABETIC ULCER OF TOE OF LEFT FOOT ASSOCIATED WITH DIABETES MELLITUS DUE TO UNDERLYING CONDITION, WITH NECROSIS OF BONE (HCC): ICD-10-CM

## 2023-12-04 DIAGNOSIS — M86.072 ACUTE HEMATOGENOUS OSTEOMYELITIS OF LEFT FOOT (HCC): Primary | ICD-10-CM

## 2023-12-04 DIAGNOSIS — L97.524 NON-PRESSURE CHRONIC ULCER OF OTHER PART OF LEFT FOOT WITH NECROSIS OF BONE (HCC): ICD-10-CM

## 2023-12-04 PROCEDURE — 3074F SYST BP LT 130 MM HG: CPT | Performed by: NURSE PRACTITIONER

## 2023-12-04 PROCEDURE — 1036F TOBACCO NON-USER: CPT | Performed by: NURSE PRACTITIONER

## 2023-12-04 PROCEDURE — 3017F COLORECTAL CA SCREEN DOC REV: CPT | Performed by: NURSE PRACTITIONER

## 2023-12-04 PROCEDURE — G8417 CALC BMI ABV UP PARAM F/U: HCPCS | Performed by: NURSE PRACTITIONER

## 2023-12-04 PROCEDURE — 99214 OFFICE O/P EST MOD 30 MIN: CPT | Performed by: NURSE PRACTITIONER

## 2023-12-04 PROCEDURE — G8484 FLU IMMUNIZE NO ADMIN: HCPCS | Performed by: NURSE PRACTITIONER

## 2023-12-04 PROCEDURE — 3078F DIAST BP <80 MM HG: CPT | Performed by: NURSE PRACTITIONER

## 2023-12-04 PROCEDURE — G8427 DOCREV CUR MEDS BY ELIG CLIN: HCPCS | Performed by: NURSE PRACTITIONER

## 2023-12-04 NOTE — PATIENT INSTRUCTIONS
Visit Discharge/Physician Orders:    Continue Ancef until end date 12/8/23    Home health to remove PICC line at completion of treatment 12/8/23. Keep next scheduled appointment. Please give 24 hour notice if unable to keep appointment. [phone]    If you experience any new onset or worsening symptoms, please call the Infectious Disease Clinic at [phone]. This line is monitored twice weekly. If you need medical attention outside of business hours, please contact your Primary Care Doctor or go to the nearest emergency room.

## 2023-12-04 NOTE — PROGRESS NOTES
necrosis of bone    Contributing Factors: diabetes, chronic pressure, obesity, and anticoagulation therapy    Patient Active Problem List   Diagnosis Code    Diabetes ([address]) E11.9    Injury of peroneal tendon of left foot S86.302A    Hypertension I10    Hyperlipidemia E78.5    Hypovitaminosis D E55.9    Diabetic foot infection ([facility]) E11.628, L08.9    Class 3 severe obesity due to excess calories in adult [facility]) E66.01    MSSA (methicillin susceptible Staphylococcus aureus) infection A49.01    Osteomyelitis of left foot ([facility]) M86.9    Diabetic peripheral neuropathy associated with type 2 diabetes mellitus ([facility]) E11.42    Ulcer of foot due to type 2 diabetes mellitus ([facility]) E11.621, L97.509    Non-pressure chronic ulcer of other part of left foot with necrosis of bone ([address]) L97.524    Receiving intravenous antibiotic treatment at home Z79.2         Plan:     Patient examined and evaluated    -Osteomyelitis, left foot, MSSA infection; Receiving IV antibiotics at home- Patient continues on IV Ancef, no adverse effects noted. Blood work reviewed, CRP within normal limits and ESR remains slightly elevated. Will continue Ancef as scheduled with end date of 12/8/23, for total of 6 weeks. Patient has exposed bone to wound bed on examination today. Encouraged her to call Dr. Hathaway Lapping office to update him on this new finding. Will maintain PICC line at this time pending evaluation by Dr. Yogesh Warren as I do not want to remove PICC if he plans on taking patient to surgery for this new problem. If no surgery scheduled, home health to remove per protocol 12/8/23. [address] [address] skilled nursing [facility] Line management and lab draws weekly. Recommend yogurt or probiotic daily while on antibiotics for GI health. -Diabetic ulcer, left foot with necrosis of bone- Wound now has exposed bone visible. Encouraged her to call Dr. Yogesh Warren regarding this.   Silver alginate, roll gauze and ACE wrap applied today in

## 2023-12-07 ENCOUNTER — TELEPHONE (OUTPATIENT)
Dept: INFECTIOUS DISEASES | Age: 53
End: 2023-12-07

## 2023-12-07 NOTE — TELEPHONE ENCOUNTER
Called pateint, spoke to her regarding plans for surgery. She states that she is scheduled for removal of affected metatarsal 12/11/23. She states that this is planned as an outpatient procedure with plans to go home after. Will continue to maintain PICC line until that time. Plan for removal after she returns home. Updated orders given to [address] [place] [address].

## 2024-01-16 RX ORDER — EMPAGLIFLOZIN 25 MG/1
25 TABLET, FILM COATED ORAL DAILY
Qty: 90 TABLET | Refills: 3 | Status: SHIPPED | OUTPATIENT
Start: 2024-01-16

## 2024-03-21 RX ORDER — PROCHLORPERAZINE 25 MG/1
1 SUPPOSITORY RECTAL
Qty: 1 EACH | Refills: 1 | Status: SHIPPED | OUTPATIENT
Start: 2024-03-21

## 2024-05-16 ENCOUNTER — OFFICE VISIT (OUTPATIENT)
Dept: INTERNAL MEDICINE CLINIC | Age: 54
End: 2024-05-16
Payer: MEDICARE

## 2024-05-16 VITALS
BODY MASS INDEX: 41.09 KG/M2 | WEIGHT: 254.6 LBS | HEART RATE: 85 BPM | SYSTOLIC BLOOD PRESSURE: 120 MMHG | TEMPERATURE: 97.7 F | OXYGEN SATURATION: 96 % | RESPIRATION RATE: 18 BRPM | DIASTOLIC BLOOD PRESSURE: 70 MMHG

## 2024-05-16 DIAGNOSIS — E66.01 CLASS 3 SEVERE OBESITY DUE TO EXCESS CALORIES IN ADULT, UNSPECIFIED BMI, UNSPECIFIED WHETHER SERIOUS COMORBIDITY PRESENT (HCC): ICD-10-CM

## 2024-05-16 DIAGNOSIS — E11.69 TYPE 2 DIABETES MELLITUS WITH OTHER SPECIFIED COMPLICATION, WITH LONG-TERM CURRENT USE OF INSULIN (HCC): Primary | ICD-10-CM

## 2024-05-16 DIAGNOSIS — Z79.4 TYPE 2 DIABETES MELLITUS WITH OTHER SPECIFIED COMPLICATION, WITH LONG-TERM CURRENT USE OF INSULIN (HCC): Primary | ICD-10-CM

## 2024-05-16 DIAGNOSIS — I10 PRIMARY HYPERTENSION: ICD-10-CM

## 2024-05-16 LAB — HBA1C MFR BLD: 7.7 % (ref 4.3–5.7)

## 2024-05-16 PROCEDURE — G8427 DOCREV CUR MEDS BY ELIG CLIN: HCPCS | Performed by: INTERNAL MEDICINE

## 2024-05-16 PROCEDURE — 83036 HEMOGLOBIN GLYCOSYLATED A1C: CPT | Performed by: INTERNAL MEDICINE

## 2024-05-16 PROCEDURE — 3078F DIAST BP <80 MM HG: CPT | Performed by: INTERNAL MEDICINE

## 2024-05-16 PROCEDURE — 3017F COLORECTAL CA SCREEN DOC REV: CPT | Performed by: INTERNAL MEDICINE

## 2024-05-16 PROCEDURE — G8417 CALC BMI ABV UP PARAM F/U: HCPCS | Performed by: INTERNAL MEDICINE

## 2024-05-16 PROCEDURE — 2022F DILAT RTA XM EVC RTNOPTHY: CPT | Performed by: INTERNAL MEDICINE

## 2024-05-16 PROCEDURE — 99214 OFFICE O/P EST MOD 30 MIN: CPT | Performed by: INTERNAL MEDICINE

## 2024-05-16 PROCEDURE — 1036F TOBACCO NON-USER: CPT | Performed by: INTERNAL MEDICINE

## 2024-05-16 PROCEDURE — 3074F SYST BP LT 130 MM HG: CPT | Performed by: INTERNAL MEDICINE

## 2024-05-16 PROCEDURE — 3051F HG A1C>EQUAL 7.0%<8.0%: CPT | Performed by: INTERNAL MEDICINE

## 2024-05-16 RX ORDER — PIOGLITAZONEHYDROCHLORIDE 30 MG/1
30 TABLET ORAL
Qty: 90 TABLET | Refills: 1 | Status: SHIPPED | OUTPATIENT
Start: 2024-05-16

## 2024-05-16 ASSESSMENT — PATIENT HEALTH QUESTIONNAIRE - PHQ9
SUM OF ALL RESPONSES TO PHQ QUESTIONS 1-9: 2
2. FEELING DOWN, DEPRESSED OR HOPELESS: SEVERAL DAYS
SUM OF ALL RESPONSES TO PHQ QUESTIONS 1-9: 2
SUM OF ALL RESPONSES TO PHQ QUESTIONS 1-9: 2
1. LITTLE INTEREST OR PLEASURE IN DOING THINGS: SEVERAL DAYS
SUM OF ALL RESPONSES TO PHQ QUESTIONS 1-9: 2
SUM OF ALL RESPONSES TO PHQ9 QUESTIONS 1 & 2: 2

## 2024-05-16 NOTE — PROGRESS NOTES
pump with jardiance,   and she will follow up with Dasha in our diabetic clinic , in about a month.  Strong dietary changes and regular exercise was highly recommended, along with maintaining a healthy diet and portion control was d/w patient. As she has both CAD and DM, with an elevated BMI.  She is checking her sugars 4 times per day. She has a   Dexcom .  Patient is tolerating Ozempic shots,  2 milligrams weekly.        Dyslipidemia is controlled, is on lipitor 40 mg daily   And does follow up with mati Arce at [facility] , plus she has CABG.        Signed by: Hiram Toney M.D.    [facility] Internal Medicine  [address] [place] [address], [address]  [place] [address]    Tel  [phone]   Fax [phone]

## 2024-07-24 ENCOUNTER — OFFICE VISIT (OUTPATIENT)
Dept: INTERNAL MEDICINE CLINIC | Age: 54
End: 2024-07-24
Payer: MEDICARE

## 2024-07-24 DIAGNOSIS — E11.42 DIABETIC PERIPHERAL NEUROPATHY ASSOCIATED WITH TYPE 2 DIABETES MELLITUS (HCC): Primary | ICD-10-CM

## 2024-07-24 PROCEDURE — NBSRV NON-BILLABLE SERVICE: Performed by: PHARMACIST

## 2024-07-24 PROCEDURE — G0108 DIAB MANAGE TRN  PER INDIV: HCPCS | Performed by: PHARMACIST

## 2024-07-24 NOTE — PROGRESS NOTES
[facility]  [address] [place] [address]  [phone] (phone)  [phone] (fax)    Patient ID: Dee Roca 1970  Referring Provider: Dr. Toney     Patient's name and  were verified.    Subjective:    She presents for a follow-up diabetic visit. She has type 2 diabetes mellitus. She is compliant some of the time.  Assessment:     Lab Results   Component Value Date/Time    LABA1C 7.7 2024 10:21 AM    LABA1C 7.0 2023 12:00 AM    BUN 10 10/26/2023 10:34 AM    CREATININE 0.66 10/26/2023 10:34 AM     There were no vitals filed for this visit.  Wt Readings from Last 3 Encounters:   24 115.5 kg (254 lb 9.6 oz)   23 121.1 kg (267 lb)   23 114.6 kg (252 lb 9.6 oz)     Ht Readings from Last 3 Encounters:   23 1.676 m (5' 6\")   23 1.676 m (5' 6\")   10/23/23 1.676 m (5' 6\")       GFR Non-   Date Value Ref Range Status   2022 >60 >60 mL/min Final         Diabetes Pharmacotherapy:  Jardiance 25mg daily  Ozempic 2mg weekly  Pioglitazone 30mg ddaily - weight gain since starting 1 month ago  Novolog insulin per Medtronic 780G pump - Uses Manual Mode    Glucose Trends:   Current monitoring regimen: Dexcom CGM - checks 4+ times daily - switching to Guardian today  Home blood sugar trends: Dexcom download is unavailable  Any episodes of hypoglycemia? no    Lifestyle Factors:   Previous visit with dietician: yes -   Current diet: did not review in depth  Current exercise:  did not review in depth    Health Maintenance:  Diabetes Management   Topic Date Due    Lipids  2022    Diabetic foot exam  2023    Diabetic retinal exam  2024    Diabetic Alb to Cr ratio (uACR) test  2024       Eye exam current (within one year): yes Date: 2024, Dr. Diane  Any history of foot problems? yes - hx of toe amputation - 10 toes amputated   Foot exam current: yes Date: 2024, Dr. Florian  Immunizations up to date:    Pneumonia:

## 2024-07-24 NOTE — PATIENT INSTRUCTIONS
Stop in for a download of your pump sometime 8/12-8/15  -Call if you have issues any sooner    2. We will call if you need your lipid or urine protein lab work

## 2024-08-05 NOTE — TELEPHONE ENCOUNTER
Last ordered 10/11, disp 9 ml, refill 3    Last visit 5/16:    She needs better control of her Type 2 DM, on insulin pump along with jardiance, but elevated A1c. No history of CHF, and had recent LHC by   Dr. Pedraza as outlined above, add actos 30 mg daily and close monitoring of the sugars along with dietary and life style changes. On max dose of ozempic weekly.     Next visit 9/19

## 2024-08-06 RX ORDER — SEMAGLUTIDE 2.68 MG/ML
2 INJECTION, SOLUTION SUBCUTANEOUS WEEKLY
Qty: 4 ML | Refills: 4 | Status: SHIPPED | OUTPATIENT
Start: 2024-08-06

## 2024-08-09 RX ORDER — INSULIN ASPART 100 [IU]/ML
INJECTION, SOLUTION INTRAVENOUS; SUBCUTANEOUS
Qty: 140 ML | Refills: 3 | Status: SHIPPED | OUTPATIENT
Start: 2024-08-09

## 2024-08-28 VITALS
TEMPERATURE: 96.9 F | BODY MASS INDEX: 41.97 KG/M2 | HEART RATE: 78 BPM | DIASTOLIC BLOOD PRESSURE: 70 MMHG | WEIGHT: 260 LBS | SYSTOLIC BLOOD PRESSURE: 122 MMHG

## 2024-09-13 ENCOUNTER — OFFICE VISIT (OUTPATIENT)
Dept: INTERNAL MEDICINE CLINIC | Age: 54
End: 2024-09-13
Payer: MEDICARE

## 2024-09-13 VITALS
SYSTOLIC BLOOD PRESSURE: 116 MMHG | HEART RATE: 80 BPM | WEIGHT: 255.4 LBS | TEMPERATURE: 97.7 F | DIASTOLIC BLOOD PRESSURE: 72 MMHG | RESPIRATION RATE: 18 BRPM | BODY MASS INDEX: 41.22 KG/M2

## 2024-09-13 DIAGNOSIS — E78.00 PURE HYPERCHOLESTEROLEMIA: ICD-10-CM

## 2024-09-13 DIAGNOSIS — E11.69 TYPE 2 DIABETES MELLITUS WITH OTHER SPECIFIED COMPLICATION, WITH LONG-TERM CURRENT USE OF INSULIN (HCC): Primary | ICD-10-CM

## 2024-09-13 DIAGNOSIS — E66.01 CLASS 3 SEVERE OBESITY DUE TO EXCESS CALORIES WITH SERIOUS COMORBIDITY IN ADULT, UNSPECIFIED BMI (HCC): ICD-10-CM

## 2024-09-13 DIAGNOSIS — Z79.4 TYPE 2 DIABETES MELLITUS WITH OTHER SPECIFIED COMPLICATION, WITH LONG-TERM CURRENT USE OF INSULIN (HCC): Primary | ICD-10-CM

## 2024-09-13 PROCEDURE — G8417 CALC BMI ABV UP PARAM F/U: HCPCS | Performed by: INTERNAL MEDICINE

## 2024-09-13 PROCEDURE — 2022F DILAT RTA XM EVC RTNOPTHY: CPT | Performed by: INTERNAL MEDICINE

## 2024-09-13 PROCEDURE — 3051F HG A1C>EQUAL 7.0%<8.0%: CPT | Performed by: INTERNAL MEDICINE

## 2024-09-13 PROCEDURE — 3078F DIAST BP <80 MM HG: CPT | Performed by: INTERNAL MEDICINE

## 2024-09-13 PROCEDURE — 99214 OFFICE O/P EST MOD 30 MIN: CPT | Performed by: INTERNAL MEDICINE

## 2024-09-13 PROCEDURE — 3017F COLORECTAL CA SCREEN DOC REV: CPT | Performed by: INTERNAL MEDICINE

## 2024-09-13 PROCEDURE — G8427 DOCREV CUR MEDS BY ELIG CLIN: HCPCS | Performed by: INTERNAL MEDICINE

## 2024-09-13 PROCEDURE — 83036 HEMOGLOBIN GLYCOSYLATED A1C: CPT | Performed by: INTERNAL MEDICINE

## 2024-09-13 PROCEDURE — 3074F SYST BP LT 130 MM HG: CPT | Performed by: INTERNAL MEDICINE

## 2024-09-13 PROCEDURE — 1036F TOBACCO NON-USER: CPT | Performed by: INTERNAL MEDICINE

## 2024-09-13 RX ORDER — PIOGLITAZONEHYDROCHLORIDE 45 MG/1
45 TABLET ORAL
Qty: 90 TABLET | Refills: 1 | Status: SHIPPED | OUTPATIENT
Start: 2024-09-13

## 2024-09-13 SDOH — ECONOMIC STABILITY: INCOME INSECURITY: HOW HARD IS IT FOR YOU TO PAY FOR THE VERY BASICS LIKE FOOD, HOUSING, MEDICAL CARE, AND HEATING?: NOT HARD AT ALL

## 2024-09-13 SDOH — ECONOMIC STABILITY: FOOD INSECURITY: WITHIN THE PAST 12 MONTHS, YOU WORRIED THAT YOUR FOOD WOULD RUN OUT BEFORE YOU GOT MONEY TO BUY MORE.: NEVER TRUE

## 2024-09-13 SDOH — ECONOMIC STABILITY: FOOD INSECURITY: WITHIN THE PAST 12 MONTHS, THE FOOD YOU BOUGHT JUST DIDN'T LAST AND YOU DIDN'T HAVE MONEY TO GET MORE.: NEVER TRUE

## 2024-10-03 DIAGNOSIS — E11.69 TYPE 2 DIABETES MELLITUS WITH OTHER SPECIFIED COMPLICATION, WITH LONG-TERM CURRENT USE OF INSULIN (HCC): ICD-10-CM

## 2024-10-03 DIAGNOSIS — Z79.4 TYPE 2 DIABETES MELLITUS WITH OTHER SPECIFIED COMPLICATION, WITH LONG-TERM CURRENT USE OF INSULIN (HCC): ICD-10-CM

## 2024-10-07 RX ORDER — LANCETS
1 EACH MISCELLANEOUS 4 TIMES DAILY
Qty: 300 EACH | Refills: 12 | Status: SHIPPED | OUTPATIENT
Start: 2024-10-07

## 2024-10-07 RX ORDER — ISOPROPYL ALCOHOL 0.75 G/1
1 SWAB TOPICAL 4 TIMES DAILY
Qty: 100 EACH | Refills: 12 | Status: SHIPPED | OUTPATIENT
Start: 2024-10-07

## 2024-10-07 RX ORDER — BLOOD-GLUCOSE METER
1 EACH MISCELLANEOUS 4 TIMES DAILY
Qty: 1 KIT | Refills: 12 | Status: SHIPPED | OUTPATIENT
Start: 2024-10-07

## 2024-10-07 RX ORDER — BLOOD GLUCOSE CONTROL HIGH,LOW
EACH MISCELLANEOUS
Qty: 1 EACH | Refills: 12 | Status: SHIPPED | OUTPATIENT
Start: 2024-10-07

## 2024-10-07 RX ORDER — BLOOD SUGAR DIAGNOSTIC
STRIP MISCELLANEOUS
Qty: 300 STRIP | Refills: 0 | Status: SHIPPED | OUTPATIENT
Start: 2024-10-07

## 2024-10-22 ENCOUNTER — OFFICE VISIT (OUTPATIENT)
Dept: INTERNAL MEDICINE CLINIC | Age: 54
End: 2024-10-22
Payer: MEDICARE

## 2024-10-22 VITALS
DIASTOLIC BLOOD PRESSURE: 80 MMHG | TEMPERATURE: 97.4 F | SYSTOLIC BLOOD PRESSURE: 138 MMHG | WEIGHT: 260.8 LBS | HEIGHT: 66 IN | HEART RATE: 91 BPM | BODY MASS INDEX: 41.91 KG/M2

## 2024-10-22 DIAGNOSIS — E11.69 TYPE 2 DIABETES MELLITUS WITH OTHER SPECIFIED COMPLICATION, WITH LONG-TERM CURRENT USE OF INSULIN (HCC): Primary | ICD-10-CM

## 2024-10-22 DIAGNOSIS — Z79.4 TYPE 2 DIABETES MELLITUS WITH OTHER SPECIFIED COMPLICATION, WITH LONG-TERM CURRENT USE OF INSULIN (HCC): Primary | ICD-10-CM

## 2024-10-22 LAB — HBA1C MFR BLD: 7.5 % (ref 4.3–5.7)

## 2024-10-22 PROCEDURE — NBSRV NON-BILLABLE SERVICE: Performed by: REGISTERED NURSE

## 2024-10-22 PROCEDURE — G0108 DIAB MANAGE TRN  PER INDIV: HCPCS | Performed by: REGISTERED NURSE

## 2024-10-22 RX ORDER — AMMONIUM LACTATE 12 G/100G
LOTION TOPICAL PRN
COMMUNITY
Start: 2024-02-29

## 2024-10-22 RX ORDER — SEMAGLUTIDE 2.68 MG/ML
2 INJECTION, SOLUTION SUBCUTANEOUS WEEKLY
Qty: 12 ML | Refills: 2 | Status: SHIPPED | OUTPATIENT
Start: 2024-10-22

## 2024-10-22 RX ORDER — FENOFIBRATE 48 MG/1
48 TABLET, COATED ORAL DAILY
COMMUNITY
Start: 2024-09-19

## 2024-10-22 NOTE — PROGRESS NOTES
[facility]  [address] [place] [address]  [phone] (phone)  [phone] (fax)    Patient ID: Dee Roca 1970  Referring Provider: Dr. Toney     Patient's name and  were verified.    Subjective:    She presents for a follow-up diabetic visit. She has type 2 diabetes mellitus. She is compliant some of the time.  Assessment:     Lab Results   Component Value Date/Time    LABA1C 7.5 10/22/2024 08:28 AM    LABA1C 7.0 2023 12:00 AM    BUN 10 10/26/2023 10:34 AM    CREATININE 0.66 10/26/2023 10:34 AM     Vitals:    10/22/24 0936   BP: 138/80   Site: Right Upper Arm   Position: Sitting   Pulse: 91   Temp: 97.4 °F (36.3 °C)   Weight: 118.3 kg (260 lb 12.8 oz)   Height: 1.676 m (5' 6\")     Wt Readings from Last 3 Encounters:   10/22/24 118.3 kg (260 lb 12.8 oz)   24 115.8 kg (255 lb 6.4 oz)   24 117.9 kg (260 lb)     Ht Readings from Last 3 Encounters:   10/22/24 1.676 m (5' 6\")   23 1.676 m (5' 6\")   23 1.676 m (5' 6\")     Est, Glom Filt Rate   Date Value Ref Range Status   2017 >90 ml/min/1.73m2 Final     GFR Non-   Date Value Ref Range Status   2022 >60 >60 mL/min Final     Diabetes Pharmacotherapy:  Novolog insulin per Medtronic 780G pump - Uses Automated Mode 88% of the time  Jardiance 25mg daily  Ozempic 2mg weekly:   Pioglitizone 45mg daily    Glucose Trends:   Current monitoring regimen: Guardian CGM - checks 4+ times daily  Home blood sugar trends:    -V. High: 8%, High: 23%, Target: 69%, Low: 0%, V. Low: 0%   -Average Glucose: 157mg/dL; GMI: 7.1%;  %time CGM active 86%              -glucose levels trend down overnight with elevations with meals during the                    Day. Note many meals are not getting bolused for.  Any episodes of hypoglycemia? yes - 1-2 times per week   -Treats with smarties    Lifestyle Factors:   Previous visit with dietician: yes - 2020  Current diet: B: egg/ toast/ coffee

## 2024-10-22 NOTE — PATIENT INSTRUCTIONS
Ask your doctor about Prevnar 20 (new pneumonia vaccine)  Ask Dr. Toney about getting GVoke (2 pack) OR  Zegalouge               In place of Glucagon  Blood sugar target 110  Bolus for all carbohydrates eaten (unless you are treating a low blood sugar)        --sticky notes to remind on the fridge or cupboard door??  Consider temp target when you are exercising if the exercise makes your blood               Sugar drop low  Plan:  10 minutes of Desean sanchez exercise                  Do this right after lunch at the dining room chair           Take advantage of nice days--walk around outside as much as possible

## 2024-11-13 ENCOUNTER — TELEPHONE (OUTPATIENT)
Dept: INTERNAL MEDICINE CLINIC | Age: 54
End: 2024-11-13

## 2024-11-13 NOTE — TELEPHONE ENCOUNTER
Dee called having pump issues. She reports that they have replaced her pump but still getting blocked flow/ unable to pump insulin.  They are replacing the reservoirs and infusion sets, but will not arrive for 3 days. (Note-she is able to manually push insulin through the reservoir/ tubing). She is asking about insulin dosing while off the pump. Reviewed ICR 1:1.6gm bkfst, 1:1.4gm lunch and 1:1gm dinner.  And 1:12mg correction for BS's over 130. Reminded there is no basal insulin. She will have to dose Novolog every 4-6 hours. Miminfb-hpjik-terzur-Bed and middle of the night as needed. Dee voiced understanding of these instructions via teach back.

## 2024-12-19 NOTE — TELEPHONE ENCOUNTER
Last ordered 5/25/2023, disp 15 pen, refill 5    Last visit 9/13:    She needs better control of her Type 2 DM, on insulin pump along with jardiance, but elevated A1c. No history of CHF, and had recent LHC by   Dr. Pedraza as outlined above, add actos 30 mg daily and close monitoring of the sugars along with dietary and life style changes. On max dose of ozempic weekly.             Hemoglobin A1C POC   Date Value Ref Range Status   05/16/2024 7.7 (H) 4.3 - 5.7 % Final       Comment:       Performed at [facility] Office under CLIA #  [id number]               Lab Results   Component Value Date/Time      10/26/2023 10:34 AM     K 4.6 10/26/2023 10:34 AM      10/26/2023 10:34 AM     CO2 31 10/26/2023 10:34 AM     BUN 10 10/26/2023 10:34 AM     CREATININE 0.66 10/26/2023 10:34 AM     GLUCOSE 129 10/26/2023 10:34 AM     CALCIUM 9.10 10/26/2023 10:34 AM     LABGLOM >60 02/22/2022 12:15 PM     LABGLOM >90 11/28/2017 09:33 AM     Next visit 1/16

## 2024-12-20 RX ORDER — INSULIN ASPART 100 [IU]/ML
INJECTION, SOLUTION INTRAVENOUS; SUBCUTANEOUS
Qty: 10 ML | Refills: 3 | Status: SHIPPED | OUTPATIENT
Start: 2024-12-20

## 2025-02-10 NOTE — TELEPHONE ENCOUNTER
Last ordered 9/13, disp 90, refill 1    Last visit 9/13:    She needs better control of her Type 2 DM, on insulin pump along with jardiance, but elevated A1c. No history of CHF, and had recent LHC by   Dr. Pedraza as outlined above, add actos 30 mg daily and close monitoring of the sugars along with dietary and life style changes. On max dose of ozempic weekly.     Next visit 3/24

## 2025-02-11 RX ORDER — PIOGLITAZONE 45 MG/1
TABLET ORAL
Qty: 90 TABLET | Refills: 3 | Status: SHIPPED | OUTPATIENT
Start: 2025-02-11

## 2025-03-24 ENCOUNTER — OFFICE VISIT (OUTPATIENT)
Dept: INTERNAL MEDICINE CLINIC | Age: 55
End: 2025-03-24
Payer: MEDICARE

## 2025-03-24 VITALS
WEIGHT: 255 LBS | SYSTOLIC BLOOD PRESSURE: 134 MMHG | TEMPERATURE: 98.2 F | DIASTOLIC BLOOD PRESSURE: 68 MMHG | HEART RATE: 84 BPM | BODY MASS INDEX: 41.16 KG/M2

## 2025-03-24 DIAGNOSIS — Z79.4 TYPE 2 DIABETES MELLITUS WITH OTHER SPECIFIED COMPLICATION, WITH LONG-TERM CURRENT USE OF INSULIN: Primary | ICD-10-CM

## 2025-03-24 DIAGNOSIS — E11.69 TYPE 2 DIABETES MELLITUS WITH OTHER SPECIFIED COMPLICATION, WITH LONG-TERM CURRENT USE OF INSULIN: Primary | ICD-10-CM

## 2025-03-24 PROCEDURE — NBSRV NON-BILLABLE SERVICE: Performed by: PHARMACIST

## 2025-03-24 PROCEDURE — G0108 DIAB MANAGE TRN  PER INDIV: HCPCS | Performed by: PHARMACIST

## 2025-03-24 RX ORDER — HYDROCODONE BITARTRATE AND ACETAMINOPHEN 5; 325 MG/1; MG/1
1 TABLET ORAL EVERY 6 HOURS PRN
COMMUNITY

## 2025-03-24 NOTE — PATIENT INSTRUCTIONS
We will send Mounjaro to your pharmacy. Start on 7.5 mg once weekly for 1 month, then fill 10 mg dose once weekly. Once you receive the Mounjaro, stop the Ozempic.  Consider increasing protein intake when you are not hungry - protein shakes, hard-boiled eggs.  Start decreasing your Actos, by splitting the pills in half. 45 mg --> 22.5 mg dose.   Ask your doctor about Prevnar 20 (new pneumonia vaccine) and get it at your          local pharmacy.  Pre-bolus for all carbohydrates eaten (unless you are treating a low blood sugar).  Keep up the great work in increasing exercise efforts and getting up and stay         active - we want you to build your strength.  7.   Keep track of your sugar intake - try to eat dinner around 6 to 7 PM. Keep         meals consistent - try to eat breakfast, lunch, and dinner.

## 2025-03-24 NOTE — PROGRESS NOTES
[facility]  [address] [place] [address]  [phone] (phone)  [phone] (fax)    Patient ID: Dee Roca 1970  Referring Provider: Dr. Toney     Patient's name and  were verified.    Subjective:    She presents for a follow-up diabetic visit. She has type 2 diabetes mellitus. She is compliant some of the time.  Assessment:     Lab Results   Component Value Date/Time    LABA1C 8.1 2025 11:01 AM    BUN 10 10/26/2023 10:34 AM    CREATININE 0.66 10/26/2023 10:34 AM     Vitals:    25 1250 25 1251   BP: (!) 183/70 134/68   Pulse: 84    Temp: 98.2 °F (36.8 °C)    Weight: 115.7 kg (255 lb)      Wt Readings from Last 3 Encounters:   25 115.7 kg (255 lb)   10/22/24 118.3 kg (260 lb 12.8 oz)   24 115.8 kg (255 lb 6.4 oz)     Ht Readings from Last 3 Encounters:   10/22/24 1.676 m (5' 6\")   23 1.676 m (5' 6\")   23 1.676 m (5' 6\")     Est, Glom Filt Rate   Date Value Ref Range Status   2017 >90 ml/min/1.73m2 Final     GFR Non-   Date Value Ref Range Status   2022 >60 >60 mL/min Final         Diabetes Pharmacotherapy:  Jardiance 25mg daily  Ozempic 2mg weekly; on Sundays  Pioglitizone 45mg daily  Novolog insulin per Medtronic 780G pump - Uses Automated Mode    Glucose Trends:   Current monitoring regimen: Guardian CGM - checks 4+ times daily  Home blood sugar trends:    -V. High: 11%, High: 28%, Target: 61%, Low: 0%, V. Low: 0%   -Average Glucose: 204 mg/dL;  Any episodes of hypoglycemia? yes - lows at night    -Treats with honey    Lifestyle Factors:   Previous visit with dietician: yes - 2020  Current diet: B: has been skipping,             L: salads with chicken and veggies                       D: 8:30 PM - meat beef and chicken, potatoes, pasta, brown rice, green beans or                             peas                       Snacks: pretzels, peanut butter, carrots, girl  cookies, increase sweets, mini

## 2025-03-25 RX ORDER — INSULIN ASPART 100 [IU]/ML
INJECTION, SOLUTION INTRAVENOUS; SUBCUTANEOUS
Qty: 130 ML | Refills: 3 | Status: SHIPPED | OUTPATIENT
Start: 2025-03-25

## 2025-04-22 ENCOUNTER — TELEPHONE (OUTPATIENT)
Dept: INTERNAL MEDICINE CLINIC | Age: 55
End: 2025-04-22

## 2025-04-22 NOTE — TELEPHONE ENCOUNTER
Call received from Dee. Her mail order pharmacy held Mounjaro order due to allergy with Victoza. Called pharmacy to inform them that the \"allergy\" to Victoza was an intolerance - nausea. They will process the order.     For Pharmacy Admin Tracking Only    Program: Medical Group  Time Spent (min): 15        Dasha Yañez PharmD, BCPS, [facility]  Internal Medicine Clinical Pharmacist  [phone]

## 2025-05-08 ENCOUNTER — TELEPHONE (OUTPATIENT)
Dept: INTERNAL MEDICINE CLINIC | Age: 55
End: 2025-05-08

## 2025-05-28 ENCOUNTER — TELEPHONE (OUTPATIENT)
Dept: INTERNAL MEDICINE CLINIC | Age: 55
End: 2025-05-28

## 2025-05-28 ENCOUNTER — OFFICE VISIT (OUTPATIENT)
Dept: INTERNAL MEDICINE CLINIC | Age: 55
End: 2025-05-28
Payer: MEDICARE

## 2025-05-28 VITALS
WEIGHT: 247.2 LBS | HEIGHT: 66 IN | SYSTOLIC BLOOD PRESSURE: 122 MMHG | BODY MASS INDEX: 39.73 KG/M2 | HEART RATE: 77 BPM | DIASTOLIC BLOOD PRESSURE: 68 MMHG | TEMPERATURE: 97.8 F

## 2025-05-28 DIAGNOSIS — Z79.4 TYPE 2 DIABETES MELLITUS WITH OTHER SPECIFIED COMPLICATION, WITH LONG-TERM CURRENT USE OF INSULIN (HCC): Primary | ICD-10-CM

## 2025-05-28 DIAGNOSIS — E11.69 TYPE 2 DIABETES MELLITUS WITH OTHER SPECIFIED COMPLICATION, WITH LONG-TERM CURRENT USE OF INSULIN (HCC): ICD-10-CM

## 2025-05-28 DIAGNOSIS — Z79.4 TYPE 2 DIABETES MELLITUS WITH OTHER SPECIFIED COMPLICATION, WITH LONG-TERM CURRENT USE OF INSULIN (HCC): ICD-10-CM

## 2025-05-28 DIAGNOSIS — E11.69 TYPE 2 DIABETES MELLITUS WITH OTHER SPECIFIED COMPLICATION, WITH LONG-TERM CURRENT USE OF INSULIN (HCC): Primary | ICD-10-CM

## 2025-05-28 PROCEDURE — G0108 DIAB MANAGE TRN  PER INDIV: HCPCS | Performed by: REGISTERED NURSE

## 2025-05-28 PROCEDURE — NBSRV NON-BILLABLE SERVICE: Performed by: REGISTERED NURSE

## 2025-05-28 ASSESSMENT — PATIENT HEALTH QUESTIONNAIRE - PHQ9
2. FEELING DOWN, DEPRESSED OR HOPELESS: SEVERAL DAYS
SUM OF ALL RESPONSES TO PHQ QUESTIONS 1-9: 2
1. LITTLE INTEREST OR PLEASURE IN DOING THINGS: SEVERAL DAYS
SUM OF ALL RESPONSES TO PHQ QUESTIONS 1-9: 2

## 2025-05-28 NOTE — TELEPHONE ENCOUNTER
Diabetes Pharmacotherapy:  Jardiance 25mg daily--ran out 2 weeks ago  Mounjaro 7.5mg weekly; Wednesday. Has order 10mg but has not had this filled yet  Pioglitizone 45mg daily  Novolog insulin per Medtronic 780G pump - Uses Automated Mode     Glucose Trends:   Glucose at 45min today resulted at 183mg/dl  Current monitoring regimen: Guardian CGM - checks 4+ times daily  Home blood sugar trends:               -V. High: 2%, High: 22%, Target: 76%, Low: 0%, V. Low: 0%              -Average Glucose: 152mg/dL; GMI: 6.9%;  %time CGM active 85%              -glucose levels stable overnight; some elevation in glucose levels through the day from fair to poor meal clearanc  Any episodes of hypoglycemia? yes - 2 per week; timing varies: middle of the night;                       Higher risk in the afternoon      Dee has run out of Jardiance about 2 weeks ago and does not have a current script at [facility].   Please address pended script. CPA Dr. Toney

## 2025-05-28 NOTE — PATIENT INSTRUCTIONS
Ask your family doctor about the Prenvar 20 pneumonia vaccine  Get Jardiance back in place           --will ask to have new script sent to [facility]            When you get your Jardiance back in place, begin cutting         Actos in half (22.5mg) daily as previously directed  Keep working on bolusing for any meal or snack          -when you put food in the microwave = bolus          -when you get your plate out = bolus  Work on setting up your medtronic mobile harper to help limit             How often you have to pull your pump out!

## 2025-05-28 NOTE — PROGRESS NOTES
[facility]  [address] [place] [address]  [phone] (phone)  [phone] (fax)    Patient ID: Dee Roca [id number]  Referring Provider: Dr. Toney     Patient's name and  were verified.    Subjective:    She presents for a follow-up diabetic visit. She has type 2 diabetes mellitus. She is compliant some of the time.  Assessment:     Lab Results   Component Value Date/Time    LABA1C 8.1 2025 11:01 AM    BUN 10 10/26/2023 10:34 AM    CREATININE 0.66 10/26/2023 10:34 AM     Vitals:    25 0927 25 0928   BP: (!) 162/73 122/68   BP Site: Right Upper Arm Left Upper Arm   Patient Position: Sitting Sitting   Pulse: 77    Temp: 97.8 °F (36.6 °C)    Weight: 112.1 kg (247 lb 3.2 oz)    Height: 1.676 m (5' 6\")      Wt Readings from Last 3 Encounters:   25 112.1 kg (247 lb 3.2 oz)   25 115.7 kg (255 lb)   10/22/24 118.3 kg (260 lb 12.8 oz)     Ht Readings from Last 3 Encounters:   25 1.676 m (5' 6\")   10/22/24 1.676 m (5' 6\")   23 1.676 m (5' 6\")     Est, Glom Filt Rate   Date Value Ref Range Status   2017 >90 ml/min/1.73m2 Final     GFR Non-   Date Value Ref Range Status   2022 >60 >60 mL/min Final     Diabetes Pharmacotherapy:  Jardiance 25mg daily--ran out 2 weeks ago  Mounjaro 7.5mg weekly; Wednesday. Has order 10mg but has not had this filled yet  Pioglitizone 45mg daily  Novolog insulin per Medtronic 780G pump - Uses Automated Mode    Glucose Trends:   Glucose at 45min today resulted at 183mg/dl  Current monitoring regimen: Guardian CGM - checks 4+ times daily  Home blood sugar trends:    -V. High: 2%, High: 22%, Target: 76%, Low: 0%, V. Low: 0%   -Average Glucose: 152mg/dL; GMI: 6.9%;  %time CGM active 85%              -glucose levels stable overnight; some elevation in glucose levels through the day from fair to poor meal clearanc  Any episodes of hypoglycemia? yes - 2 per week; timing varies: middle of the night;

## 2025-06-02 ENCOUNTER — TELEPHONE (OUTPATIENT)
Dept: INTERNAL MEDICINE CLINIC | Age: 55
End: 2025-06-02

## 2025-06-02 DIAGNOSIS — E11.69 TYPE 2 DIABETES MELLITUS WITH OTHER SPECIFIED COMPLICATION, WITH LONG-TERM CURRENT USE OF INSULIN (HCC): Primary | ICD-10-CM

## 2025-06-02 DIAGNOSIS — Z79.4 TYPE 2 DIABETES MELLITUS WITH OTHER SPECIFIED COMPLICATION, WITH LONG-TERM CURRENT USE OF INSULIN (HCC): Primary | ICD-10-CM

## 2025-06-03 NOTE — TELEPHONE ENCOUNTER
Dee is overdue for routine labs r/t her Diabetes care. She will follow with you on 6/30/2025.  Do you wish to have the pended labs completed prior to her visit with you? If you agree with labs ordered, please note and return. Thank you.

## 2025-06-09 NOTE — TELEPHONE ENCOUNTER
Spoke to Dee. I let her know that Dr. Toney agreed to the labs to be completed. Patient stated she would go to [facility] to have them done.

## 2025-06-12 ENCOUNTER — HOSPITAL ENCOUNTER (OUTPATIENT)
Age: 55
Discharge: HOME | End: 2025-06-12
Payer: MEDICARE

## 2025-06-12 DIAGNOSIS — E78.00 PURE HYPERCHOLESTEROLEMIA: ICD-10-CM

## 2025-06-12 DIAGNOSIS — I10 PRIMARY HYPERTENSION: ICD-10-CM

## 2025-06-12 DIAGNOSIS — Z79.4 TYPE 2 DIABETES MELLITUS WITH OTHER SPECIFIED COMPLICATION, WITH LONG-TERM CURRENT USE OF INSULIN (HCC): ICD-10-CM

## 2025-06-12 DIAGNOSIS — E11.69 TYPE 2 DIABETES MELLITUS WITH OTHER SPECIFIED COMPLICATION, WITH LONG-TERM CURRENT USE OF INSULIN (HCC): ICD-10-CM

## 2025-06-12 DIAGNOSIS — Z79.4 TYPE 2 DIABETES MELLITUS WITH OTHER SPECIFIED COMPLICATION, WITH LONG-TERM CURRENT USE OF INSULIN (HCC): Primary | ICD-10-CM

## 2025-06-12 DIAGNOSIS — E11.69 TYPE 2 DIABETES MELLITUS WITH OTHER SPECIFIED COMPLICATION, WITH LONG-TERM CURRENT USE OF INSULIN (HCC): Primary | ICD-10-CM

## 2025-06-12 LAB
ANION GAP SERPL CALC-SCNC: 12 MEQ/L (ref 8–16)
BUN SERPL-MCNC: 9 MG/DL (ref 8–23)
CALCIUM SERPL-MCNC: 9.9 MG/DL (ref 8.6–10)
CHLORIDE SERPL-SCNC: 102 MEQ/L (ref 98–111)
CHOLEST SERPL-MCNC: 131 MG/DL (ref 100–199)
CO2 SERPL-SCNC: 28 MEQ/L (ref 22–29)
CREAT SERPL-MCNC: 0.7 MG/DL (ref 0.5–0.9)
CREAT UR-MCNC: 66 MG/DL
DEPRECATED RDW RBC AUTO: 41.9 FL (ref 35–45)
ERYTHROCYTE [DISTWIDTH] IN BLOOD BY AUTOMATED COUNT: 12.9 % (ref 11.5–14.5)
GFR SERPL CREATININE-BSD FRML MDRD: > 90 ML/MIN/1.73M2
GLUCOSE SERPL-MCNC: 203 MG/DL (ref 74–109)
HCT VFR BLD AUTO: 45 % (ref 37–47)
HDLC SERPL-MCNC: 37 MG/DL
HGB BLD-MCNC: 15.1 GM/DL (ref 12–16)
LDLC SERPL CALC-MCNC: 68 MG/DL
MCH RBC QN AUTO: 30.6 PG (ref 26–33)
MCHC RBC AUTO-ENTMCNC: 33.6 GM/DL (ref 32.2–35.5)
MCV RBC AUTO: 91.1 FL (ref 81–99)
MICROALBUMIN UR-MCNC: < 2 MG/DL
MICROALBUMIN/CREAT RATIO PNL UR: 30 MG/G (ref 0–30)
PLATELET # BLD AUTO: 260 THOU/MM3 (ref 130–400)
PMV BLD AUTO: 10.8 FL (ref 9.4–12.4)
POTASSIUM SERPL-SCNC: 4.7 MEQ/L (ref 3.5–5.2)
RBC # BLD AUTO: 4.94 MILL/MM3 (ref 4.2–5.4)
SODIUM SERPL-SCNC: 142 MEQ/L (ref 135–145)
TRIGL SERPL-MCNC: 129 MG/DL (ref 0–199)
TSH SERPL DL<=0.05 MIU/L-ACNC: 1.9 UIU/ML (ref 0.27–4.2)
WBC # BLD AUTO: 7 THOU/MM3 (ref 4.8–10.8)

## 2025-06-12 PROCEDURE — 80048 BASIC METABOLIC PNL TOTAL CA: CPT

## 2025-06-12 PROCEDURE — 85027 COMPLETE CBC AUTOMATED: CPT

## 2025-06-12 PROCEDURE — 84443 ASSAY THYROID STIM HORMONE: CPT

## 2025-06-12 PROCEDURE — 82043 UR ALBUMIN QUANTITATIVE: CPT

## 2025-06-12 PROCEDURE — 80061 LIPID PANEL: CPT

## 2025-06-12 PROCEDURE — 36415 COLL VENOUS BLD VENIPUNCTURE: CPT

## 2025-07-25 DIAGNOSIS — Z79.4 TYPE 2 DIABETES MELLITUS WITH OTHER SPECIFIED COMPLICATION, WITH LONG-TERM CURRENT USE OF INSULIN (HCC): ICD-10-CM

## 2025-07-25 DIAGNOSIS — E11.69 TYPE 2 DIABETES MELLITUS WITH OTHER SPECIFIED COMPLICATION, WITH LONG-TERM CURRENT USE OF INSULIN (HCC): ICD-10-CM

## 2025-08-07 ENCOUNTER — OFFICE VISIT (OUTPATIENT)
Dept: INTERNAL MEDICINE CLINIC | Age: 55
End: 2025-08-07
Payer: MEDICARE

## 2025-08-07 VITALS
DIASTOLIC BLOOD PRESSURE: 70 MMHG | TEMPERATURE: 98 F | HEART RATE: 68 BPM | SYSTOLIC BLOOD PRESSURE: 116 MMHG | WEIGHT: 242.6 LBS | OXYGEN SATURATION: 98 % | RESPIRATION RATE: 18 BRPM | BODY MASS INDEX: 39.16 KG/M2

## 2025-08-07 DIAGNOSIS — Z79.4 TYPE 2 DIABETES MELLITUS WITH OTHER SPECIFIED COMPLICATION, WITH LONG-TERM CURRENT USE OF INSULIN (HCC): Primary | ICD-10-CM

## 2025-08-07 DIAGNOSIS — E78.2 MIXED HYPERLIPIDEMIA: ICD-10-CM

## 2025-08-07 DIAGNOSIS — I10 PRIMARY HYPERTENSION: ICD-10-CM

## 2025-08-07 DIAGNOSIS — E11.42 DIABETIC PERIPHERAL NEUROPATHY ASSOCIATED WITH TYPE 2 DIABETES MELLITUS (HCC): ICD-10-CM

## 2025-08-07 DIAGNOSIS — E11.69 TYPE 2 DIABETES MELLITUS WITH OTHER SPECIFIED COMPLICATION, WITH LONG-TERM CURRENT USE OF INSULIN (HCC): Primary | ICD-10-CM

## 2025-08-07 DIAGNOSIS — L97.524 NON-PRESSURE CHRONIC ULCER OF OTHER PART OF LEFT FOOT WITH NECROSIS OF BONE (HCC): ICD-10-CM

## 2025-08-07 LAB — HBA1C MFR BLD: 7.5 % (ref 4.3–5.7)

## 2025-08-07 PROCEDURE — 3078F DIAST BP <80 MM HG: CPT | Performed by: INTERNAL MEDICINE

## 2025-08-07 PROCEDURE — 99214 OFFICE O/P EST MOD 30 MIN: CPT | Performed by: INTERNAL MEDICINE

## 2025-08-07 PROCEDURE — 3051F HG A1C>EQUAL 7.0%<8.0%: CPT | Performed by: INTERNAL MEDICINE

## 2025-08-07 PROCEDURE — 1036F TOBACCO NON-USER: CPT | Performed by: INTERNAL MEDICINE

## 2025-08-07 PROCEDURE — G8417 CALC BMI ABV UP PARAM F/U: HCPCS | Performed by: INTERNAL MEDICINE

## 2025-08-07 PROCEDURE — 83036 HEMOGLOBIN GLYCOSYLATED A1C: CPT | Performed by: INTERNAL MEDICINE

## 2025-08-07 PROCEDURE — 3017F COLORECTAL CA SCREEN DOC REV: CPT | Performed by: INTERNAL MEDICINE

## 2025-08-07 PROCEDURE — 3074F SYST BP LT 130 MM HG: CPT | Performed by: INTERNAL MEDICINE

## 2025-08-07 PROCEDURE — G8427 DOCREV CUR MEDS BY ELIG CLIN: HCPCS | Performed by: INTERNAL MEDICINE

## 2025-08-07 PROCEDURE — 2022F DILAT RTA XM EVC RTNOPTHY: CPT | Performed by: INTERNAL MEDICINE

## 2025-08-07 SDOH — ECONOMIC STABILITY: FOOD INSECURITY: WITHIN THE PAST 12 MONTHS, THE FOOD YOU BOUGHT JUST DIDN'T LAST AND YOU DIDN'T HAVE MONEY TO GET MORE.: NEVER TRUE

## 2025-08-07 SDOH — ECONOMIC STABILITY: FOOD INSECURITY: WITHIN THE PAST 12 MONTHS, YOU WORRIED THAT YOUR FOOD WOULD RUN OUT BEFORE YOU GOT MONEY TO BUY MORE.: NEVER TRUE
